# Patient Record
Sex: MALE | Race: WHITE | Employment: FULL TIME | URBAN - NONMETROPOLITAN AREA
[De-identification: names, ages, dates, MRNs, and addresses within clinical notes are randomized per-mention and may not be internally consistent; named-entity substitution may affect disease eponyms.]

---

## 2017-09-11 ENCOUNTER — OFFICE VISIT (OUTPATIENT)
Dept: ONCOLOGY | Age: 27
End: 2017-09-11
Payer: MEDICARE

## 2017-09-11 ENCOUNTER — HOSPITAL ENCOUNTER (OUTPATIENT)
Dept: INFUSION THERAPY | Age: 27
Discharge: HOME | End: 2017-09-11
Payer: MEDICARE

## 2017-09-11 VITALS
DIASTOLIC BLOOD PRESSURE: 69 MMHG | WEIGHT: 117.6 LBS | TEMPERATURE: 97.3 F | BODY MASS INDEX: 19.59 KG/M2 | RESPIRATION RATE: 18 BRPM | HEIGHT: 65 IN | OXYGEN SATURATION: 99 % | SYSTOLIC BLOOD PRESSURE: 117 MMHG | HEART RATE: 91 BPM

## 2017-09-11 DIAGNOSIS — Q87.89: ICD-10-CM

## 2017-09-11 DIAGNOSIS — D69.1 PLATELET DISORDER (HCC): Primary | ICD-10-CM

## 2017-09-11 DIAGNOSIS — D69.1 PLATELET DISORDER (HCC): ICD-10-CM

## 2017-09-11 DIAGNOSIS — D69.6 THROMBOCYTOPENIA (HCC): ICD-10-CM

## 2017-09-11 LAB
APTT: 36.4 SECONDS (ref 22–38)
BASOPHILS # BLD: 0.6 %
BASOPHILS ABSOLUTE: 0 THOU/MM3 (ref 0–0.1)
BUN, WHOLE BLOOD: 18 MG/DL (ref 8–26)
CHLORIDE, WHOLE BLOOD: 100 MEQ/L (ref 98–109)
CREATININE, WHOLE BLOOD: 1 MG/DL (ref 0.5–1.2)
EOSINOPHIL # BLD: 3.7 %
EOSINOPHILS ABSOLUTE: 0.2 THOU/MM3 (ref 0–0.4)
GFR, ESTIMATED ,CON: > 90 ML/MIN/1.73M2
GLUCOSE, WHOLE BLOOD: 99 MG/DL (ref 70–108)
HCT VFR BLD CALC: 44.1 % (ref 42–52)
HEMOGLOBIN: 14.8 GM/DL (ref 14–18)
INR BLD: 0.99 (ref 0.85–1.13)
IONIZED CALCIUM, WHOLE BLOOD: 1.3 MMOL/L (ref 1.12–1.32)
LYMPHOCYTES # BLD: 27 %
LYMPHOCYTES ABSOLUTE: 1.5 THOU/MM3 (ref 1–4.8)
MCH RBC QN AUTO: 32.2 PG (ref 27–31)
MCHC RBC AUTO-ENTMCNC: 33.6 GM/DL (ref 33–37)
MCV RBC AUTO: 96 FL (ref 80–94)
MONOCYTES # BLD: 6.6 %
MONOCYTES ABSOLUTE: 0.4 THOU/MM3 (ref 0.4–1.3)
NUCLEATED RED BLOOD CELLS: 0 /100 WBC
PDW BLD-RTO: 10.6 % (ref 11.5–14.5)
PLATELET # BLD: 124 THOU/MM3 (ref 130–400)
PMV BLD AUTO: 9 MCM (ref 7.4–10.4)
POTASSIUM, WHOLE BLOOD: 4.4 MEQ/L (ref 3.5–4.9)
RBC # BLD: 4.61 MILL/MM3 (ref 4.7–6.1)
RBC # BLD: NORMAL 10*6/UL
SEG NEUTROPHILS: 62.1 %
SEGMENTED NEUTROPHILS ABSOLUTE COUNT: 3.5 THOU/MM3 (ref 1.8–7.7)
SODIUM, WHOLE BLOOD: 139 MEQ/L (ref 138–146)
TOTAL CO2, WHOLE BLOOD: 30 MEQ/L (ref 23–33)
WBC # BLD: 5.6 THOU/MM3 (ref 4.8–10.8)

## 2017-09-11 PROCEDURE — 99203 OFFICE O/P NEW LOW 30 MIN: CPT | Performed by: INTERNAL MEDICINE

## 2017-09-11 PROCEDURE — 85610 PROTHROMBIN TIME: CPT

## 2017-09-11 PROCEDURE — 85730 THROMBOPLASTIN TIME PARTIAL: CPT

## 2017-09-11 PROCEDURE — 36415 COLL VENOUS BLD VENIPUNCTURE: CPT

## 2017-09-11 PROCEDURE — 99211 OFF/OP EST MAY X REQ PHY/QHP: CPT

## 2017-09-11 PROCEDURE — G8427 DOCREV CUR MEDS BY ELIG CLIN: HCPCS | Performed by: INTERNAL MEDICINE

## 2017-09-11 PROCEDURE — 80047 BASIC METABLC PNL IONIZED CA: CPT

## 2017-09-11 PROCEDURE — 1036F TOBACCO NON-USER: CPT | Performed by: INTERNAL MEDICINE

## 2017-09-11 PROCEDURE — 85025 COMPLETE CBC W/AUTO DIFF WBC: CPT

## 2017-09-11 PROCEDURE — G8420 CALC BMI NORM PARAMETERS: HCPCS | Performed by: INTERNAL MEDICINE

## 2017-09-11 RX ORDER — LEVOTHYROXINE SODIUM 50 MCG
50 TABLET ORAL DAILY
COMMUNITY
Start: 2017-08-22

## 2017-09-11 RX ORDER — TIZANIDINE 2 MG/1
2 TABLET ORAL EVERY 8 HOURS PRN
COMMUNITY
Start: 2017-08-30 | End: 2017-09-11 | Stop reason: ALTCHOICE

## 2017-09-11 RX ORDER — IBUPROFEN 200 MG
200 TABLET ORAL EVERY 6 HOURS PRN
COMMUNITY

## 2017-09-11 NOTE — MR AVS SNAPSHOT
After Visit Summary             Juan Hoffman   2017 1:30 PM   Office Visit    Description:  Male : 1990   Provider:  Anup Oro MD   Department:  [id number] [address] and Future Appointments         Below is a list of your follow-up and future appointments. This may not be a complete list as you may have made appointments directly with providers that we are not aware of or your providers may have made some for you. Please call your providers to confirm appointments. It is important to keep your appointments. Please bring your current insurance card, photo ID, co-pay, and all medication bottles to your appointment. If self-pay, payment is expected at the time of service. Your To-Do List     Future Appointments Provider Department Dept Phone    10/17/2017 3:00 PM Alfrieda Hammans, MD [facility] [phone]    Please arrive 15 minutes prior to scheduled appointment time to complete paper work, bring photo ID and insurance cards. Future Orders Complete By Expires    APTT [RUH967 Custom]  2017    CBC Auto Differential [[id number] Custom]  2017    Hepatic Function Panel [LAB20 Custom]  2017    POC PANEL BMP W/IOCA [[id number] Custom]  2017    Protime-INR [CNQ690 Custom]  2017    CBC Auto Differential [[id number] Custom]  9/10/2018 2018    Follow-Up    Return in about 1 year (around 9/10/2018).          Information from Your Visit        Department     Name Address Phone Fax    [id number] of [facility] [address] [address] [facility] [address] [phone]      You Were Seen for:         Comments    Platelet disorder [facility])   [[id number]]         Vital Signs     Blood Pressure Pulse Temperature Respirations Height Weight    117/69 (Site: Right Arm, Position: Sitting, Cuff Size: Medium Adult) 91 you do not sign up before the expiration date, you must request a new code. [employer] Access Code: [email or web address]  Expires: 11/10/2017  2:32 PM    4. Enter your Social Security Number ([id number]) and Date of Birth (mm/dd/yyyy) as indicated and click Submit. You will be taken to the next sign-up page. 5. Create a [employer] ID. This will be your [employer] login ID and cannot be changed, so think of one that is secure and easy to remember. 6. Create a [employer] password. You can change your password at any time. 7. Enter your Password Reset Question and Answer. This can be used at a later time if you forget your password. 8. Enter your e-mail address. You will receive e-mail notification when new information is available in [address] 9. Click Sign Up. You can now view your medical record. Additional Information  If you have questions, please contact the physician practice where you receive care. Remember, [employer] is NOT to be used for urgent needs. For medical emergencies, dial 911. For questions regarding your [employer] account call [phone]. If you have a clinical question, please call your doctor's office.

## 2017-10-16 PROBLEM — D69.1 PLATELET DISORDER (HCC): Status: ACTIVE | Noted: 2017-10-16

## 2017-10-16 PROBLEM — D69.6 THROMBOCYTOPENIA (HCC): Status: ACTIVE | Noted: 2017-10-16

## 2017-10-16 PROBLEM — Q87.89: Status: ACTIVE | Noted: 2017-10-16

## 2017-10-16 ASSESSMENT — ENCOUNTER SYMPTOMS
GASTROINTESTINAL NEGATIVE: 1
EYES NEGATIVE: 1
RESPIRATORY NEGATIVE: 1

## 2017-10-17 ENCOUNTER — OFFICE VISIT (OUTPATIENT)
Dept: CARDIOLOGY CLINIC | Age: 27
End: 2017-10-17
Payer: MEDICARE

## 2017-10-17 VITALS
BODY MASS INDEX: 20.69 KG/M2 | HEIGHT: 64 IN | HEART RATE: 94 BPM | SYSTOLIC BLOOD PRESSURE: 108 MMHG | WEIGHT: 121.2 LBS | DIASTOLIC BLOOD PRESSURE: 84 MMHG

## 2017-10-17 DIAGNOSIS — Q21.0 VSD (VENTRICULAR SEPTAL DEFECT): Primary | ICD-10-CM

## 2017-10-17 PROCEDURE — 99203 OFFICE O/P NEW LOW 30 MIN: CPT | Performed by: NUCLEAR MEDICINE

## 2017-10-17 PROCEDURE — 1036F TOBACCO NON-USER: CPT | Performed by: NUCLEAR MEDICINE

## 2017-10-17 PROCEDURE — G8420 CALC BMI NORM PARAMETERS: HCPCS | Performed by: NUCLEAR MEDICINE

## 2017-10-17 PROCEDURE — G8484 FLU IMMUNIZE NO ADMIN: HCPCS | Performed by: NUCLEAR MEDICINE

## 2017-10-17 PROCEDURE — G8427 DOCREV CUR MEDS BY ELIG CLIN: HCPCS | Performed by: NUCLEAR MEDICINE

## 2017-10-17 PROCEDURE — 93000 ELECTROCARDIOGRAM COMPLETE: CPT | Performed by: NUCLEAR MEDICINE

## 2017-10-17 ASSESSMENT — ENCOUNTER SYMPTOMS
CONSTIPATION: 0
ABDOMINAL DISTENTION: 0
RECTAL PAIN: 0
FACIAL SWELLING: 0
NAUSEA: 0
ANAL BLEEDING: 0
DIARRHEA: 0
SHORTNESS OF BREATH: 1
ABDOMINAL PAIN: 0
VOMITING: 0
BACK PAIN: 0
PHOTOPHOBIA: 0
CHEST TIGHTNESS: 0
BLOOD IN STOOL: 0

## 2017-10-17 NOTE — PROGRESS NOTES
[address] [facility]  [facility]  [address] [facility] [address]  Dept: [phone]  Dept Fax: [phone]  Loc: [phone]    Visit Date: 10/17/2017    Tyrone Joyner is a 33 y.o. male who presents today for:  Chief Complaint   Patient presents with   [facility] Establish Cardiologist    Ventricular Septal Defect     Here as a new patient   Used to see [facility] for a VSD  They were seeing some genetic specialist for some unusual diagnosis  No specific diagnosis given for now  Found to have a VSD  Followed since then   Does have unusal features   No given diagnosis  No smoking   Family history of CAD    HPI:  HPI  Past Medical History:   Diagnosis Date    Bleeds easily ([address]     Bruises easily     Thyroid disease     VSD (ventricular septal defect and aortic arch hypoplasia       Past Surgical History:   Procedure Laterality Date    DENTAL SURGERY      EXTERNAL EAR SURGERY      EYE SURGERY      HERNIA REPAIR      HIP SURGERY  2003    MANDIBLE SURGERY       Family History   Problem Relation Age of Onset    No Known Problems Mother     No Known Problems Father      Social History   Substance Use Topics    Smoking status: Never Smoker    Smokeless tobacco: Never Used    Alcohol use No      Current Outpatient Prescriptions   Medication Sig Dispense Refill    SYNTHROID 50 MCG tablet Take 50 mcg by mouth daily       ibuprofen (ADVIL;MOTRIN) 200 MG tablet Take 200 mg by mouth every 6 hours as needed for Pain       No current facility-administered medications for this visit. No Known Allergies  Health Maintenance   Topic Date Due    HIV screen  07/29/2006    DTaP/Tdap/Td vaccine (1 - Tdap) 07/29/2010    Flu vaccine (1) 09/01/2017       Subjective:  Review of Systems   Constitutional: Positive for fatigue. Negative for chills and diaphoresis. HENT: Negative for facial swelling and mouth sores. Eyes: Negative for photophobia.    Respiratory: Positive for shortness

## 2017-10-17 NOTE — PROGRESS NOTES
[facility] CANCER CENTER  CANCER NETWORK OF [facility]  [facility] [address] [facility]  [address] [address]  Dept: [phone]  Dept Fax: [phone]  Loc: [phone]    Subjective:      Chief Complaint: Zita Angela is a 33 y.o. male with thrombocytopenia. HPI: This is the first visit to our clinic for this 52-year-old male. He is accompanied by his mother who also provide some of his medical history. The patient has previously been followed at [facility] in [place] as well as the [facility] at [facility]. 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 The patient has required DDAVP and platelet transfusions as necessary for any specific procedures. He does not have any specific hematological complaints today. The patient would like to transfer his care from Dr. Norberto Varela at the [facility] to our clinic for further evaluation and management of his hematological disease locally. At this time he does not have any evidence of abnormal bleeding or bruising. His general health has been stable. He has no specific complaints today. The patient denies shortness of breath, chest pain, a change in bowel habits or a change in bladder habits. ECOG performance status is level 0. Past Medical History  She has a past medical history of Bleeds easily ([address] Bruises easily; and Thyroid disease. Surgical History  She has a past surgical history that includes hip surgery (2003). Home Medications  She has a current medication list which includes the following prescription(s): synthroid and ibuprofen. Allergies  No Known Allergies    Social History  She reports that he has never smoked.  He has never used smokeless tobacco. He reports that he does not drink alcohol or use

## 2018-09-17 DIAGNOSIS — D69.6 THROMBOCYTOPENIA (HCC): Primary | ICD-10-CM

## 2018-09-19 ENCOUNTER — OFFICE VISIT (OUTPATIENT)
Dept: ONCOLOGY | Age: 28
End: 2018-09-19
Payer: MEDICARE

## 2018-09-19 ENCOUNTER — HOSPITAL ENCOUNTER (OUTPATIENT)
Dept: INFUSION THERAPY | Age: 28
Discharge: HOME | End: 2018-09-19
Payer: MEDICARE

## 2018-09-19 VITALS
HEIGHT: 64 IN | BODY MASS INDEX: 21.24 KG/M2 | HEART RATE: 90 BPM | SYSTOLIC BLOOD PRESSURE: 100 MMHG | TEMPERATURE: 97.8 F | WEIGHT: 124.4 LBS | DIASTOLIC BLOOD PRESSURE: 64 MMHG | RESPIRATION RATE: 18 BRPM

## 2018-09-19 DIAGNOSIS — D69.6 THROMBOCYTOPENIA (HCC): Primary | ICD-10-CM

## 2018-09-19 DIAGNOSIS — D69.1 PLATELET DISORDER (HCC): ICD-10-CM

## 2018-09-19 DIAGNOSIS — Q87.89: ICD-10-CM

## 2018-09-19 DIAGNOSIS — D69.6 THROMBOCYTOPENIA (HCC): ICD-10-CM

## 2018-09-19 LAB
BASOPHILS # BLD: 0.9 %
BASOPHILS ABSOLUTE: 0 THOU/MM3 (ref 0–0.1)
EOSINOPHIL # BLD: 3.8 %
EOSINOPHILS ABSOLUTE: 0.2 THOU/MM3 (ref 0–0.4)
HCT VFR BLD CALC: 43.8 % (ref 42–52)
HEMOGLOBIN: 14.6 GM/DL (ref 14–18)
IMMATURE GRANS (ABS): 0.02 THOU/MM3 (ref 0–0.07)
IMMATURE GRANULOCYTES: 0.4 %
LYMPHOCYTES # BLD: 30.4 %
LYMPHOCYTES ABSOLUTE: 1.5 THOU/MM3 (ref 1–4.8)
MCH RBC QN AUTO: 32.7 PG (ref 27–31)
MCHC RBC AUTO-ENTMCNC: 33.3 GM/DL (ref 33–37)
MCV RBC AUTO: 98 FL (ref 80–94)
MONOCYTES # BLD: 9.9 %
MONOCYTES ABSOLUTE: 0.5 THOU/MM3 (ref 0.4–1.3)
NUCLEATED RED BLOOD CELLS: 0 /100 WBC
PDW BLD-RTO: 11 % (ref 11.5–14.5)
PLATELET # BLD: 102 THOU/MM3 (ref 130–400)
PMV BLD AUTO: 9.2 FL (ref 7.4–10.4)
RBC # BLD: 4.46 MILL/MM3 (ref 4.7–6.1)
SEG NEUTROPHILS: 54.6 %
SEGMENTED NEUTROPHILS ABSOLUTE COUNT: 2.6 THOU/MM3 (ref 1.8–7.7)
WBC # BLD: 4.8 THOU/MM3 (ref 4.8–10.8)

## 2018-09-19 PROCEDURE — 36415 COLL VENOUS BLD VENIPUNCTURE: CPT

## 2018-09-19 PROCEDURE — 99211 OFF/OP EST MAY X REQ PHY/QHP: CPT

## 2018-09-19 PROCEDURE — 99213 OFFICE O/P EST LOW 20 MIN: CPT | Performed by: INTERNAL MEDICINE

## 2018-09-19 PROCEDURE — 1036F TOBACCO NON-USER: CPT | Performed by: INTERNAL MEDICINE

## 2018-09-19 PROCEDURE — 85025 COMPLETE CBC W/AUTO DIFF WBC: CPT

## 2018-09-19 PROCEDURE — G8420 CALC BMI NORM PARAMETERS: HCPCS | Performed by: INTERNAL MEDICINE

## 2018-09-19 PROCEDURE — G8427 DOCREV CUR MEDS BY ELIG CLIN: HCPCS | Performed by: INTERNAL MEDICINE

## 2018-09-19 RX ORDER — M-VIT,TX,IRON,MINS/CALC/FOLIC 27MG-0.4MG
1 TABLET ORAL DAILY
COMMUNITY

## 2018-10-16 ENCOUNTER — OFFICE VISIT (OUTPATIENT)
Dept: CARDIOLOGY CLINIC | Age: 28
End: 2018-10-16
Payer: MEDICARE

## 2018-10-16 VITALS
WEIGHT: 122.2 LBS | HEART RATE: 80 BPM | BODY MASS INDEX: 20.86 KG/M2 | DIASTOLIC BLOOD PRESSURE: 72 MMHG | SYSTOLIC BLOOD PRESSURE: 110 MMHG | HEIGHT: 64 IN

## 2018-10-16 DIAGNOSIS — Q25.42 VSD (VENTRICULAR SEPTAL DEFECT AND AORTIC ARCH HYPOPLASIA: Primary | ICD-10-CM

## 2018-10-16 DIAGNOSIS — Q21.0 VSD (VENTRICULAR SEPTAL DEFECT AND AORTIC ARCH HYPOPLASIA: Primary | ICD-10-CM

## 2018-10-16 PROCEDURE — 99213 OFFICE O/P EST LOW 20 MIN: CPT | Performed by: NUCLEAR MEDICINE

## 2018-10-16 PROCEDURE — G8484 FLU IMMUNIZE NO ADMIN: HCPCS | Performed by: NUCLEAR MEDICINE

## 2018-10-16 PROCEDURE — G8420 CALC BMI NORM PARAMETERS: HCPCS | Performed by: NUCLEAR MEDICINE

## 2018-10-16 PROCEDURE — G8427 DOCREV CUR MEDS BY ELIG CLIN: HCPCS | Performed by: NUCLEAR MEDICINE

## 2018-10-16 PROCEDURE — 1036F TOBACCO NON-USER: CPT | Performed by: NUCLEAR MEDICINE

## 2018-10-25 DIAGNOSIS — Q25.42 VSD (VENTRICULAR SEPTAL DEFECT AND AORTIC ARCH HYPOPLASIA: ICD-10-CM

## 2018-10-25 DIAGNOSIS — Q21.0 VSD (VENTRICULAR SEPTAL DEFECT AND AORTIC ARCH HYPOPLASIA: ICD-10-CM

## 2018-10-31 ENCOUNTER — TELEPHONE (OUTPATIENT)
Dept: CARDIOLOGY CLINIC | Age: 28
End: 2018-10-31

## 2019-05-30 ENCOUNTER — TELEPHONE (OUTPATIENT)
Dept: ONCOLOGY | Age: 29
End: 2019-05-30

## 2019-05-30 ENCOUNTER — TELEPHONE (OUTPATIENT)
Dept: CARDIOLOGY CLINIC | Age: 29
End: 2019-05-30

## 2019-05-30 NOTE — TELEPHONE ENCOUNTER
Pre op Risk Assessment    Can pt be cleared or do you need to see him in office? Procedure R Ear complete with debridement and possible bx.    Physician Dr. Maya Rhodes  Date of surgery/procedure 6-4-19    Last OV 10/16/19  Last Stress   Last Echo 10/25/19  Last Cath   Last Stent   Is patient on blood thinners   Hold Meds/how many days

## 2019-05-30 NOTE — TELEPHONE ENCOUNTER
Ricco Salazar from [facility] called to inquire about clearance for procedure that Dr. Sebastián Vazquez office faxed over. I faxed copy to her @ [phone].

## 2019-09-18 ENCOUNTER — HOSPITAL ENCOUNTER (OUTPATIENT)
Dept: INFUSION THERAPY | Age: 29
Discharge: HOME | End: 2019-09-18
Payer: MEDICARE

## 2019-09-18 ENCOUNTER — OFFICE VISIT (OUTPATIENT)
Dept: ONCOLOGY | Age: 29
End: 2019-09-18
Payer: MEDICARE

## 2019-09-18 VITALS
HEIGHT: 64 IN | HEART RATE: 82 BPM | OXYGEN SATURATION: 100 % | DIASTOLIC BLOOD PRESSURE: 49 MMHG | SYSTOLIC BLOOD PRESSURE: 98 MMHG | WEIGHT: 124.2 LBS | RESPIRATION RATE: 18 BRPM | TEMPERATURE: 98 F | BODY MASS INDEX: 21.21 KG/M2

## 2019-09-18 DIAGNOSIS — D69.6 THROMBOCYTOPENIA (HCC): ICD-10-CM

## 2019-09-18 DIAGNOSIS — D69.6 THROMBOCYTOPENIA (HCC): Primary | ICD-10-CM

## 2019-09-18 DIAGNOSIS — Q87.89: ICD-10-CM

## 2019-09-18 DIAGNOSIS — D69.1 PLATELET DISORDER (HCC): ICD-10-CM

## 2019-09-18 LAB
HCT VFR BLD CALC: 45.7 % (ref 42–52)
HEMOGLOBIN: 15.3 GM/DL (ref 14–18)
MCH RBC QN AUTO: 32.9 PG (ref 27–31)
MCHC RBC AUTO-ENTMCNC: 33.6 GM/DL (ref 33–37)
MCV RBC AUTO: 98 FL (ref 80–94)
PDW BLD-RTO: 11.4 % (ref 11.5–14.5)
PLATELET # BLD: 125 THOU/MM3 (ref 130–400)
PMV BLD AUTO: 9.2 FL (ref 7.4–10.4)
RBC # BLD: 4.66 MILL/MM3 (ref 4.7–6.1)
WBC # BLD: 5.5 THOU/MM3 (ref 4.8–10.8)

## 2019-09-18 PROCEDURE — 36415 COLL VENOUS BLD VENIPUNCTURE: CPT

## 2019-09-18 PROCEDURE — G8427 DOCREV CUR MEDS BY ELIG CLIN: HCPCS | Performed by: INTERNAL MEDICINE

## 2019-09-18 PROCEDURE — 85027 COMPLETE CBC AUTOMATED: CPT

## 2019-09-18 PROCEDURE — 99213 OFFICE O/P EST LOW 20 MIN: CPT | Performed by: INTERNAL MEDICINE

## 2019-09-18 PROCEDURE — 99211 OFF/OP EST MAY X REQ PHY/QHP: CPT

## 2019-09-18 PROCEDURE — 1036F TOBACCO NON-USER: CPT | Performed by: INTERNAL MEDICINE

## 2019-09-18 PROCEDURE — G8420 CALC BMI NORM PARAMETERS: HCPCS | Performed by: INTERNAL MEDICINE

## 2019-09-27 NOTE — PROGRESS NOTES
stable. Constitutional: Appears comfortable. No acute distress. HENT: Normocephalic and atraumatic. Eyes: Pupils are equal and reactive. No scleral icterus. Neck: Overall appearance is symmetrical. No identifiable masses. Pulmonary: Effort normal. No respiratory distress. Abdominal: Soft. No hepatomegaly or splenomegaly. Musculoskeletal: Gait is normal. Muscle strength and tone good. Neurological: Alert and oriented to person, place, and time. Judgment and thought content impaired. Skin: Skin is warm and dry. No rash. No petechiae   Psychiatric: Mood and affect appropriate for the clinical situation. Behavior is normal.      Data Analysis:    Hematology 9/18/2019 9/19/2018 9/11/2017   WBC 5.5 4.8 5.6   RBC 4.66 (L) 4.46 (L) 4.61 (L)   HGB 15.3 14.6 14.8   HCT 45.7 43.8 44.1   MCV 98 (H) 98 (H) 96 (H)   RDW 11.4 (L) 11.0 (L) 10.6 (L)    (L) 102 (L) 124 (L)     Assessment:   1. 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 Plan:   1. Monitor platelet count and for any signs of abnormal bleeding/bruising. 2. DDAVP and platelet transfusions for procedures as needed. 3. Follow up with primary care provider for general medical care. 4.  Return to hematology clinic in 1 year. Barrera Garrido M.D. Medical Director: [facility]  Cancer [facility]  [address] [facility], [address] [place] [address], [address] [place] [address], [address] [place] [address] of [facility] at [facility]      **This report has been created using voice recognition software. It may contain minor errors which are inherent in voice recognition technology. **

## 2019-10-22 ENCOUNTER — OFFICE VISIT (OUTPATIENT)
Dept: CARDIOLOGY CLINIC | Age: 29
End: 2019-10-22
Payer: MEDICARE

## 2019-10-22 VITALS
HEART RATE: 96 BPM | SYSTOLIC BLOOD PRESSURE: 104 MMHG | DIASTOLIC BLOOD PRESSURE: 62 MMHG | BODY MASS INDEX: 21.38 KG/M2 | WEIGHT: 125.22 LBS | HEIGHT: 64 IN

## 2019-10-22 DIAGNOSIS — Q21.0 VSD (VENTRICULAR SEPTAL DEFECT AND AORTIC ARCH HYPOPLASIA: Primary | ICD-10-CM

## 2019-10-22 DIAGNOSIS — Q25.42 VSD (VENTRICULAR SEPTAL DEFECT AND AORTIC ARCH HYPOPLASIA: Primary | ICD-10-CM

## 2019-10-22 PROCEDURE — G8420 CALC BMI NORM PARAMETERS: HCPCS | Performed by: NUCLEAR MEDICINE

## 2019-10-22 PROCEDURE — 99213 OFFICE O/P EST LOW 20 MIN: CPT | Performed by: NUCLEAR MEDICINE

## 2019-10-22 PROCEDURE — G8427 DOCREV CUR MEDS BY ELIG CLIN: HCPCS | Performed by: NUCLEAR MEDICINE

## 2019-10-22 PROCEDURE — 1036F TOBACCO NON-USER: CPT | Performed by: NUCLEAR MEDICINE

## 2019-10-22 PROCEDURE — G8484 FLU IMMUNIZE NO ADMIN: HCPCS | Performed by: NUCLEAR MEDICINE

## 2020-09-29 NOTE — PROGRESS NOTES
[facility] CANCER CENTER  CANCER NETWORK OF [facility]  [facility] [address] [facility]  [address]  [address]  Dept: [phone]  Dept Fax: [phone]  Loc: [phone]    Subjective:      Chief Complaint: Huong Victor is a 35 y.o. male with thrombocytopenia. The patient has previously been followed at [facility] in [place] as well as the [facility] at [facility]. 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 The patient has required DDAVP and platelet transfusions as necessary for any specific procedures    HPI: Yanet Norton returns today for follow-up regarding his history of thrombocytopenia and platelet function disorder. He is accompanied by his mother on today's evaluation. Since being seen here last he states his general sense of wellbeing has been good. He has had no significant changes in his overall health. The patient denies any abnormal bleeding or bruising. He has not had fever, cough, shortness of breath or other signs of infection. The patient's bowel and bladder habits have been normal.  He has not seen blood in his stool or urine. The patient remains active with an ECOG performance status of level 0. PMH, SH, and FH:  I reviewed the patients medication list and allergy list as noted on the electronic medical record. The PMH, SH and FH were also reviewed as noted on the EMR. Review of Systems   Constitutional: Negative. HENT: Negative. Eyes: Negative. Respiratory: Negative. Cardiovascular: Negative. Gastrointestinal: Negative. Genitourinary: Negative. Musculoskeletal: Negative. Skin: Negative. Neurological: Negative. Hematological: Negative. Psychiatric/Behavioral: Negative.       Objective:   Physical Exam   Vitals:    09/30/20 1550   BP: 111/61   Pulse: 88   Resp: 17   Temp: 98.7 °F (37.1 °C)   SpO2: 98%   Vitals reviewed and are stable. Constitutional: Well-developed and well-nourished. No acute distress. HENT: Normocephalic and atraumatic. Eyes: Pupils are equal and reactive. No scleral icterus. Neck: Overall appearance is symmetrical. No identifiable masses. Pulmonary: Effort normal. No respiratory distress. .  Neurological: Alert and oriented to person, place, and time. Judgment and thought content mildly impaired. Skin: Skin is warm and dry. No rash. Psychiatric: Mood and affect appropriate for the clinical situation. Behavior is normal.      Data Analysis:    Hematology 9/30/2020 9/18/2019 9/19/2018   WBC 5.8 5.5 4.8   RBC 4.35 (L) 4.66 (L) 4.46 (L)   HGB 14.1 15.3 14.6   HCT 43.4 45.7 43.8    (H) 98 (H) 98 (H)   RDW 12.3 11.4 (L) 11.0 (L)    (L) 125 (L) 102 (L)     Assessment:   1. 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 Plan:   1. Monitor platelet count and for any signs of abnormal bleeding/bruising. 2. DDAVP and platelet transfusions for procedures as needed. 3. Follow up with primary care provider for general medical care. Daniel Pearl M.D. Medical Director: [facility]  Cancer Network [facility]  [address] [facility], [address] [place] [address], [address] [place] [address], [address] [place] [address] of [facility] at [facility]      **This report has been created using voice recognition software. It may contain minor errors which are inherent in voice recognition technology. **

## 2020-09-30 ENCOUNTER — OFFICE VISIT (OUTPATIENT)
Dept: ONCOLOGY | Age: 30
End: 2020-09-30
Payer: MEDICARE

## 2020-09-30 ENCOUNTER — HOSPITAL ENCOUNTER (OUTPATIENT)
Dept: INFUSION THERAPY | Age: 30
Discharge: HOME | End: 2020-09-30
Payer: MEDICARE

## 2020-09-30 VITALS
RESPIRATION RATE: 17 BRPM | SYSTOLIC BLOOD PRESSURE: 111 MMHG | HEIGHT: 64 IN | HEART RATE: 88 BPM | DIASTOLIC BLOOD PRESSURE: 61 MMHG | OXYGEN SATURATION: 98 % | WEIGHT: 123.4 LBS | BODY MASS INDEX: 21.07 KG/M2 | TEMPERATURE: 98.7 F

## 2020-09-30 DIAGNOSIS — D69.6 THROMBOCYTOPENIA (HCC): ICD-10-CM

## 2020-09-30 LAB
ABSOLUTE IMMATURE GRANULOCYTE: 0 THOU/MM3 (ref 0–0.07)
BASINOPHIL, AUTOMATED: 1 % (ref 0–3)
BASOPHILS ABSOLUTE: 0.1 THOU/MM3 (ref 0–0.1)
EOSINOPHILS ABSOLUTE: 0.3 THOU/MM3 (ref 0–0.4)
EOSINOPHILS RELATIVE PERCENT: 5 % (ref 0–4)
HCT VFR BLD CALC: 43.4 % (ref 42–52)
HEMOGLOBIN: 14.1 GM/DL (ref 14–18)
IMMATURE GRANULOCYTES: 0 %
LYMPHOCYTES # BLD: 34 % (ref 15–47)
LYMPHOCYTES ABSOLUTE: 2 THOU/MM3 (ref 1–4.8)
MCH RBC QN AUTO: 32.4 PG (ref 26–33)
MCHC RBC AUTO-ENTMCNC: 32.5 GM/DL (ref 32.2–35.5)
MCV RBC AUTO: 100 FL (ref 80–94)
MONOCYTES ABSOLUTE: 0.5 THOU/MM3 (ref 0.4–1.3)
MONOCYTES: 8 % (ref 0–12)
PDW BLD-RTO: 12.3 % (ref 11.5–14.5)
PLATELET # BLD: 124 THOU/MM3 (ref 130–400)
PMV BLD AUTO: 11.2 FL (ref 9.4–12.4)
RBC # BLD: 4.35 MILL/MM3 (ref 4.7–6.1)
SEG NEUTROPHILS: 51 % (ref 43–75)
SEGMENTED NEUTROPHILS ABSOLUTE COUNT: 3 THOU/MM3 (ref 1.8–7.7)
WBC # BLD: 5.8 THOU/MM3 (ref 4.8–10.8)

## 2020-09-30 PROCEDURE — G8427 DOCREV CUR MEDS BY ELIG CLIN: HCPCS | Performed by: INTERNAL MEDICINE

## 2020-09-30 PROCEDURE — 99213 OFFICE O/P EST LOW 20 MIN: CPT | Performed by: INTERNAL MEDICINE

## 2020-09-30 PROCEDURE — 1036F TOBACCO NON-USER: CPT | Performed by: INTERNAL MEDICINE

## 2020-09-30 PROCEDURE — 36415 COLL VENOUS BLD VENIPUNCTURE: CPT

## 2020-09-30 PROCEDURE — 85025 COMPLETE CBC W/AUTO DIFF WBC: CPT

## 2020-09-30 PROCEDURE — 99211 OFF/OP EST MAY X REQ PHY/QHP: CPT

## 2020-09-30 PROCEDURE — G8420 CALC BMI NORM PARAMETERS: HCPCS | Performed by: INTERNAL MEDICINE

## 2020-10-27 ENCOUNTER — OFFICE VISIT (OUTPATIENT)
Dept: CARDIOLOGY CLINIC | Age: 30
End: 2020-10-27
Payer: MEDICARE

## 2020-10-27 VITALS
HEART RATE: 72 BPM | SYSTOLIC BLOOD PRESSURE: 106 MMHG | HEIGHT: 64 IN | BODY MASS INDEX: 20.55 KG/M2 | DIASTOLIC BLOOD PRESSURE: 60 MMHG | WEIGHT: 120.4 LBS

## 2020-10-27 PROCEDURE — G8420 CALC BMI NORM PARAMETERS: HCPCS | Performed by: NUCLEAR MEDICINE

## 2020-10-27 PROCEDURE — 99213 OFFICE O/P EST LOW 20 MIN: CPT | Performed by: NUCLEAR MEDICINE

## 2020-10-27 PROCEDURE — 1036F TOBACCO NON-USER: CPT | Performed by: NUCLEAR MEDICINE

## 2020-10-27 PROCEDURE — G8484 FLU IMMUNIZE NO ADMIN: HCPCS | Performed by: NUCLEAR MEDICINE

## 2020-10-27 PROCEDURE — G8427 DOCREV CUR MEDS BY ELIG CLIN: HCPCS | Performed by: NUCLEAR MEDICINE

## 2020-10-28 ENCOUNTER — TELEPHONE (OUTPATIENT)
Dept: CARDIOLOGY CLINIC | Age: 30
End: 2020-10-28

## 2020-10-28 NOTE — TELEPHONE ENCOUNTER
FATEMEH FOR PATIENT TO CALL.     ECHO SCHEDULED AT [facility] 11-2-2020 @ 1 PM.  PATIENT NEEDS TO ARRIVE THROUGH THE OUTPATIENT DOORS AT 5410 PM.

## 2020-10-29 NOTE — TELEPHONE ENCOUNTER
Pt returned call, can only do Wednesdays or thursdays, late afternoon    Offered lima since [facility] latest is 2:00pm, pt prefers to have done at [facility] on a Wednesday or Thursday late as possible    Rescheduled echo to 12-02-20 @ 2:00pm at [facility]

## 2020-10-29 NOTE — TELEPHONE ENCOUNTER
Left msg for pt to call office to confirm pt can come for echo 11-02-20    Need to report to [facility], outpt dept at 12:30 for echocardiogram scheduled 11-02-20 @ 1:00pm

## 2020-12-03 ENCOUNTER — TELEPHONE (OUTPATIENT)
Dept: CARDIOLOGY CLINIC | Age: 30
End: 2020-12-03

## 2020-12-03 NOTE — TELEPHONE ENCOUNTER
please have the girls at [facility] remind me to review the echo and addend it next week

## 2020-12-09 ENCOUNTER — TELEPHONE (OUTPATIENT)
Dept: CARDIOLOGY CLINIC | Age: 30
End: 2020-12-09

## 2020-12-16 ENCOUNTER — TELEPHONE (OUTPATIENT)
Dept: CARDIOLOGY CLINIC | Age: 30
End: 2020-12-16

## 2021-01-07 ENCOUNTER — HOSPITAL ENCOUNTER (OUTPATIENT)
Dept: NURSING | Age: 31
End: 2021-01-07
Payer: MEDICARE

## 2021-06-22 DIAGNOSIS — Z13.9 SCREENING FOR CONDITION: ICD-10-CM

## 2021-08-30 ENCOUNTER — HOSPITAL ENCOUNTER (OUTPATIENT)
Dept: NURSING | Age: 31
Discharge: HOME | End: 2021-08-30
Payer: COMMERCIAL

## 2021-08-30 VITALS
DIASTOLIC BLOOD PRESSURE: 61 MMHG | HEART RATE: 82 BPM | HEIGHT: 64 IN | BODY MASS INDEX: 20.76 KG/M2 | WEIGHT: 121.6 LBS | SYSTOLIC BLOOD PRESSURE: 112 MMHG | OXYGEN SATURATION: 98 % | RESPIRATION RATE: 18 BRPM | TEMPERATURE: 97.5 F

## 2021-08-30 DIAGNOSIS — Q21.0 VSD (VENTRICULAR SEPTAL DEFECT AND AORTIC ARCH HYPOPLASIA: ICD-10-CM

## 2021-08-30 DIAGNOSIS — Q25.42 VSD (VENTRICULAR SEPTAL DEFECT AND AORTIC ARCH HYPOPLASIA: ICD-10-CM

## 2021-08-30 DIAGNOSIS — R93.1 ABNORMAL ECHOCARDIOGRAM: ICD-10-CM

## 2021-08-30 LAB
ERYTHROCYTE [DISTWIDTH] IN BLOOD BY AUTOMATED COUNT: 12.5 % (ref 11.5–14.5)
ERYTHROCYTE [DISTWIDTH] IN BLOOD BY AUTOMATED COUNT: 47.1 FL (ref 35–45)
HCT VFR BLD CALC: 49.4 % (ref 42–52)
HEMOGLOBIN: 15.9 GM/DL (ref 14–18)
LV EF: 60 %
LVEF MODALITY: NORMAL
MCH RBC QN AUTO: 32.9 PG (ref 26–33)
MCHC RBC AUTO-ENTMCNC: 32.2 GM/DL (ref 32.2–35.5)
MCV RBC AUTO: 102.1 FL (ref 80–94)
PLATELET # BLD: 116 THOU/MM3 (ref 130–400)
PMV BLD AUTO: 12.2 FL (ref 9.4–12.4)
RBC # BLD: 4.84 MILL/MM3 (ref 4.7–6.1)
WBC # BLD: 7.2 THOU/MM3 (ref 4.8–10.8)

## 2021-08-30 PROCEDURE — 6360000002 HC RX W HCPCS: Performed by: NUCLEAR MEDICINE

## 2021-08-30 PROCEDURE — 93312 ECHO TRANSESOPHAGEAL: CPT

## 2021-08-30 PROCEDURE — 99152 MOD SED SAME PHYS/QHP 5/>YRS: CPT

## 2021-08-30 PROCEDURE — 6370000000 HC RX 637 (ALT 250 FOR IP)

## 2021-08-30 PROCEDURE — 85027 COMPLETE CBC AUTOMATED: CPT

## 2021-08-30 PROCEDURE — 2580000003 HC RX 258: Performed by: PHYSICIAN ASSISTANT

## 2021-08-30 PROCEDURE — 93325 DOPPLER ECHO COLOR FLOW MAPG: CPT

## 2021-08-30 PROCEDURE — 93320 DOPPLER ECHO COMPLETE: CPT

## 2021-08-30 PROCEDURE — 36415 COLL VENOUS BLD VENIPUNCTURE: CPT

## 2021-08-30 RX ORDER — SODIUM CHLORIDE 9 MG/ML
INJECTION, SOLUTION INTRAVENOUS CONTINUOUS
Status: DISCONTINUED | OUTPATIENT
Start: 2021-08-30 | End: 2021-08-31 | Stop reason: HOSPADM

## 2021-08-30 RX ORDER — SODIUM CHLORIDE 0.9 % (FLUSH) 0.9 %
5-40 SYRINGE (ML) INJECTION EVERY 12 HOURS SCHEDULED
Status: DISCONTINUED | OUTPATIENT
Start: 2021-08-30 | End: 2021-08-31 | Stop reason: HOSPADM

## 2021-08-30 RX ORDER — SODIUM CHLORIDE 0.9 % (FLUSH) 0.9 %
5-40 SYRINGE (ML) INJECTION PRN
Status: DISCONTINUED | OUTPATIENT
Start: 2021-08-30 | End: 2021-08-31 | Stop reason: HOSPADM

## 2021-08-30 RX ORDER — FLUMAZENIL 0.1 MG/ML
0.2 INJECTION, SOLUTION INTRAVENOUS PRN
Status: DISCONTINUED | OUTPATIENT
Start: 2021-08-30 | End: 2021-08-31 | Stop reason: HOSPADM

## 2021-08-30 RX ORDER — MIDAZOLAM HYDROCHLORIDE 1 MG/ML
1 INJECTION INTRAMUSCULAR; INTRAVENOUS PRN
Status: DISCONTINUED | OUTPATIENT
Start: 2021-08-30 | End: 2021-08-31 | Stop reason: HOSPADM

## 2021-08-30 RX ORDER — NALOXONE HYDROCHLORIDE 0.4 MG/ML
0.4 INJECTION, SOLUTION INTRAMUSCULAR; INTRAVENOUS; SUBCUTANEOUS PRN
Status: DISCONTINUED | OUTPATIENT
Start: 2021-08-30 | End: 2021-08-31 | Stop reason: HOSPADM

## 2021-08-30 RX ORDER — SODIUM CHLORIDE 9 MG/ML
25 INJECTION, SOLUTION INTRAVENOUS PRN
Status: DISCONTINUED | OUTPATIENT
Start: 2021-08-30 | End: 2021-08-31 | Stop reason: HOSPADM

## 2021-08-30 RX ORDER — FENTANYL CITRATE 50 UG/ML
25 INJECTION, SOLUTION INTRAMUSCULAR; INTRAVENOUS PRN
Status: DISCONTINUED | OUTPATIENT
Start: 2021-08-30 | End: 2021-08-31 | Stop reason: HOSPADM

## 2021-08-30 RX ADMIN — FENTANYL CITRATE 25 MCG: 50 INJECTION, SOLUTION INTRAMUSCULAR; INTRAVENOUS at 14:33

## 2021-08-30 RX ADMIN — MIDAZOLAM 1 MG: 1 INJECTION INTRAMUSCULAR; INTRAVENOUS at 14:33

## 2021-08-30 RX ADMIN — SODIUM CHLORIDE: 9 INJECTION, SOLUTION INTRAVENOUS at 14:15

## 2021-08-30 RX ADMIN — FENTANYL CITRATE 25 MCG: 50 INJECTION, SOLUTION INTRAMUSCULAR; INTRAVENOUS at 14:35

## 2021-08-30 RX ADMIN — MIDAZOLAM 1 MG: 1 INJECTION INTRAMUSCULAR; INTRAVENOUS at 14:37

## 2021-08-30 ASSESSMENT — PAIN SCALES - GENERAL
PAINLEVEL_OUTOF10: 0
PAINLEVEL_OUTOF10: 0

## 2021-08-30 NOTE — PROGRESS NOTES
1300:  Patient arrived ambulatory for MAMI with Dr. Susan Rivas. Patient states he has been NPO since last night. Patient is not on any blood thinners. PT RIGHTS AND RESPONSIBILITIES OFFERED TO PT.  Parents at bedside. IV fluid infusing. Blood work collected per order.                  __m__ Safety:       (Environmental)   [place] to environment   Ensure ID band is correct and in place/ allergy band as needed   Assess for fall risk   Initiate fall precautions as applicable (fall band, side rails, etc.)   Call light within reach   Bed in low position/ wheels locked    _m___ Pain:        Assess pain level and characteristics   Administer analgesics as ordered   Assess effectiveness of pain management and report to MD as needed    _m___ Knowledge Deficit:   Assess baseline knowledge   Provide teaching at level of understanding   Provide teaching via preferred learning method   Evaluate teaching effectiveness    _m___ Hemodynamic/Respiratory Status:       (Pre and Post Procedure Monitoring)   Assess/Monitor vital signs and LOC   Assess Baseline SpO2 prior to any sedation   Obtain weight/height   Assess vital signs/ LOC until patient meets discharge criteria   Monitor procedure site and notify MD of any issues

## 2021-08-30 NOTE — LETTER
[address]  [address] [facility] [address]  Phone: [phone]             August 30, 2021    Patient: Avis Perez   YOB: 1990   Date of Visit: 8/30/2021       To Whom It May Concern:    Gurwinder Eric was seen and treated in our facility  beginning 8/30/2021 . Please excuse for today. He may return to work on 8/31/2021.       Sincerely,       Dr. Matthew Calle MD, Cardiology       Signature:__________________________________

## 2021-08-30 NOTE — PROGRESS NOTES
1432: Dr. Ana M Rush at bedside. Ultrasound at bedside. Consent form verified. Timeout performed. 1433: Sedation medication started, patient tolerating well. 1439: Scope in. Vital signs as charted. 1446: Scope out. Patient tolerated well. Alert but drowsy at this time. Vitals as charted. Dr. Ana M Rush out to talk to parents. 1500: Patient alert and awake, beverage and snack provided. 1515: Vitals as charted. 1530: Patient awake and alert in room. Vitals as charted. 1550: AVS reviewed with patient and family, voiced understanding. Patient discharged in wheelchair.

## 2021-08-30 NOTE — H&P
[address] [address]  Sedation/Analgesia History & Physical    Pt Name: Rubi Israel  Account number: [de-identified]  MRN: [id number]  YOB: 1990  Provider Performing Procedure: Nate Foster MD MD [facility]  Primary Care Physician: Dmitry Baltazar  Date: 8/30/2021    PRE-PROCEDURE    Code Status: FULL CODE  Brief History/Pre-Procedure Diagnosis:   VSD AI      Consent: : I have discussed with the patient risks, benefits, and alternatives (and relevant risks, benefits, and side effects related to alternatives or not receiving care), and likelihood of the success. The patient and/or representative appear to understand and agree to proceed. The discussion encompasses risks, benefits, and side effects related to the alternatives and the risks related to not receiving the proposed care, treatment, and services. MEDICAL HISTORY  []ASHD/ANGINA/MI/CHF   []Hypertension  []Diabetes  []Hyperlipidemia  []Smoking  []Family Hx of ASHD  []Additional information:       has a past medical history of Bleeds easily ([address] Bruises easily, Thyroid disease, and VSD (ventricular septal defect and aortic arch hypoplasia. SURGICAL HISTORY   has a past surgical history that includes hip surgery (2003); Mandible surgery; External ear surgery; hernia repair; Eye surgery; and Dental surgery.   Additional information:       ALLERGIES   Allergies as of 08/30/2021    (No Known Allergies)     Additional information:       MEDICATIONS   Aspirin  [x] 81 mg  [] 325 mg  [] None  Antiplatelet drug therapy use last 5 days  []No []Yes  Coumadin Use Last 5 Days []No []Yes  Other anticoagulant use last 5 days  []No []Yes    Current Outpatient Medications:     Multiple Vitamins-Minerals (THERAPEUTIC MULTIVITAMIN-MINERALS) tablet, Take 1 tablet by mouth daily, Disp: , Rfl:     SYNTHROID 50 MCG tablet, Take 50 mcg by mouth daily , Disp: , Rfl:     ibuprofen (ADVIL;MOTRIN) 200 MG tablet, Take 200 mg by mouth every 6 hours as needed for Pain, Disp: , Rfl:     Current Facility-Administered Medications:     0.9 % sodium chloride infusion, , IntraVENous, Continuous, Hailey Sutton PA-C    0.9 % sodium chloride infusion, 25 mL, IntraVENous, PRN, Sylvester Morrissey PA-C    sodium chloride flush 0.9 % injection 5-40 mL, 5-40 mL, IntraVENous, 2 times per day, Sylvester Morrissey PA-C    sodium chloride flush 0.9 % injection 5-40 mL, 5-40 mL, IntraVENous, PRN, Sylvester Morrissey PA-C    fentaNYL (SUBLIMAZE) injection 25 mcg, 25 mcg, IntraVENous, PRN, [address], MD, 25 mcg at 08/30/21 1435    midazolam (VERSED) injection 1 mg, 1 mg, IntraVENous, PRN, [address], MD, 1 mg at 08/30/21 1437    flumazenil (ROMAZICON) injection 0.2 mg, 0.2 mg, IntraVENous, PRN, Zafar Easley MD    naloxone [facility]) injection 0.4 mg, 0.4 mg, IntraVENous, PRN, [address], MD  Prior to Admission medications    Medication Sig Start Date End Date Taking?  Authorizing Provider   Multiple Vitamins-Minerals (THERAPEUTIC MULTIVITAMIN-MINERALS) tablet Take 1 tablet by mouth daily    Historical Provider, MD   SYNTHROID 50 MCG tablet Take 50 mcg by mouth daily  8/22/17   Historical Provider, MD   ibuprofen (ADVIL;MOTRIN) 200 MG tablet Take 200 mg by mouth every 6 hours as needed for Pain    Historical Provider, MD     Additional information:       VITAL SIGNS   Vitals:    08/30/21 1445   BP: (!) 105/51   Pulse: 82   Resp:    Temp:    SpO2: 97%       PHYSICAL:   General: No acute distress  HEENT:  Unremarkable for age  Neck: without increased JVD, carotid pulses 2+ bilaterally without bruits  Heart: RRR, S1 & S2 WNL, S4 gallop, without murmurs or rubs    Lungs: Clear to auscultation    Abdomen: BS present, without HSM, masses, or tenderness    Extremities: without C,C,E.  Pulses 2+ bilaterally  Mental Status: Alert & Oriented        PLANNED PROCEDURE   []Cath  []PCI                []Pacemaker/AICD  [x]MAMI []Cardioversion []Peripheral angiography/PTA  []Other:      SEDATION  Planned agent:[x]Midazolam []Meperidine [x]Sublimaze []Morphine  []Diazepam  []Other:     ASA Classification:  []1 [x]2 []3 []4 []5  Class 1: A normal healthy patient  Class 2: Pt with mild to moderate systemic disease  Class 3: Severe systemic disease or disturbance  Class 4: Severe systemic disorders that are already life threatening. Class 5: Moribund pt with little chances of survival, for more than 24 hours. Mallampati I Airway Classification:   []1 [x]2 []3 []4    [x]Pre-procedure diagnostic studies complete and results available. Comment:    [x]Previous sedation/anesthesia experiences assessed. Comment:    [x]The patient is an appropriate candidate to undergo the planned procedure sedation and anesthesia. (Refer to nursing sedation/analgesia documentation record)  [x]Formulation and discussion of sedation/procedure plan, risks, and expectations with patient and/or responsible adult completed. [x]Patient examined immediately prior to the procedure.  (Refer to nursing sedation/analgesia documentation record)    Ntae Foster MD MD [facility]  Electronically signed 8/30/2021 at 3:23 PM

## 2021-09-08 ENCOUNTER — TELEPHONE (OUTPATIENT)
Dept: CARDIOLOGY CLINIC | Age: 31
End: 2021-09-08

## 2021-09-29 ENCOUNTER — HOSPITAL ENCOUNTER (OUTPATIENT)
Dept: INFUSION THERAPY | Age: 31
Discharge: HOME | End: 2021-09-29
Payer: COMMERCIAL

## 2021-09-29 ENCOUNTER — OFFICE VISIT (OUTPATIENT)
Dept: ONCOLOGY | Age: 31
End: 2021-09-29
Payer: COMMERCIAL

## 2021-09-29 VITALS
RESPIRATION RATE: 18 BRPM | HEIGHT: 64 IN | WEIGHT: 125 LBS | OXYGEN SATURATION: 100 % | TEMPERATURE: 98 F | DIASTOLIC BLOOD PRESSURE: 52 MMHG | SYSTOLIC BLOOD PRESSURE: 113 MMHG | HEART RATE: 74 BPM | BODY MASS INDEX: 21.34 KG/M2

## 2021-09-29 DIAGNOSIS — D69.6 THROMBOCYTOPENIA (HCC): Primary | ICD-10-CM

## 2021-09-29 DIAGNOSIS — D69.6 THROMBOCYTOPENIA (HCC): ICD-10-CM

## 2021-09-29 DIAGNOSIS — D69.1 PLATELET DISORDER (HCC): ICD-10-CM

## 2021-09-29 DIAGNOSIS — Q87.89: ICD-10-CM

## 2021-09-29 LAB
ABSOLUTE IMMATURE GRANULOCYTE: 0 THOU/MM3 (ref 0–0.07)
BASINOPHIL, AUTOMATED: 1 % (ref 0–3)
BASOPHILS ABSOLUTE: 0 THOU/MM3 (ref 0–0.1)
EOSINOPHILS ABSOLUTE: 0.3 THOU/MM3 (ref 0–0.4)
EOSINOPHILS RELATIVE PERCENT: 5 % (ref 0–4)
HCT VFR BLD CALC: 40.6 % (ref 42–52)
HEMOGLOBIN: 13.6 GM/DL (ref 14–18)
IMMATURE GRANULOCYTES: 0 %
LYMPHOCYTES # BLD: 36 % (ref 15–47)
LYMPHOCYTES ABSOLUTE: 2 THOU/MM3 (ref 1–4.8)
MCH RBC QN AUTO: 33.5 PG (ref 26–33)
MCHC RBC AUTO-ENTMCNC: 33.5 GM/DL (ref 32.2–35.5)
MCV RBC AUTO: 100 FL (ref 80–94)
MONOCYTES ABSOLUTE: 0.5 THOU/MM3 (ref 0.4–1.3)
MONOCYTES: 10 % (ref 0–12)
PDW BLD-RTO: 12.6 % (ref 11.5–14.5)
PLATELET # BLD: 107 THOU/MM3 (ref 130–400)
PMV BLD AUTO: 12.1 FL (ref 9.4–12.4)
RBC # BLD: 4.06 MILL/MM3 (ref 4.7–6.1)
SEG NEUTROPHILS: 48 % (ref 43–75)
SEGMENTED NEUTROPHILS ABSOLUTE COUNT: 2.6 THOU/MM3 (ref 1.8–7.7)
WBC # BLD: 5.4 THOU/MM3 (ref 4.8–10.8)

## 2021-09-29 PROCEDURE — 1036F TOBACCO NON-USER: CPT | Performed by: INTERNAL MEDICINE

## 2021-09-29 PROCEDURE — 99211 OFF/OP EST MAY X REQ PHY/QHP: CPT

## 2021-09-29 PROCEDURE — 85025 COMPLETE CBC W/AUTO DIFF WBC: CPT

## 2021-09-29 PROCEDURE — G8420 CALC BMI NORM PARAMETERS: HCPCS | Performed by: INTERNAL MEDICINE

## 2021-09-29 PROCEDURE — G8427 DOCREV CUR MEDS BY ELIG CLIN: HCPCS | Performed by: INTERNAL MEDICINE

## 2021-09-29 PROCEDURE — 36415 COLL VENOUS BLD VENIPUNCTURE: CPT

## 2021-09-29 PROCEDURE — 99213 OFFICE O/P EST LOW 20 MIN: CPT | Performed by: INTERNAL MEDICINE

## 2021-09-29 NOTE — PROGRESS NOTES
[facility] CANCER CENTER  CANCER NETWORK OF [facility]  [facility] [address] [facility]  [address] [address]  Dept: [phone]  Dept Fax: [phone]  Loc: [phone]    Subjective:      Chief Complaint: Adryan Hobbs is a 28 y.o. male with thrombocytopenia. The patient has previously been followed at [facility] in [facility] as well as the [facility] at [facility]. 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 The patient has required DDAVP and platelet transfusions as necessary for any specific procedures    HPI: Tami Caldera returns today for follow-up regarding his history of thrombocytopenia and platelet function disorder. On today's evaluation his general sense of wellbeing has been relatively stable. He has been undergoing cardiac evaluation by Dr. Britney Oneal. The patient recently underwent a MAMI for cardiac evaluation. He has not had fever, cough, shortness of breath or other signs of infection. The patient's bowel and bladder habits have been normal.  He has not seen blood in his stool or urine. The patient remains active with an ECOG performance status of level 0. PMH, SH, and FH:  I reviewed the patients medication list and allergy list as noted on the electronic medical record. The PMH, SH and FH were also reviewed as noted on the EMR. Review of Systems   Constitutional: Negative. HENT: Negative. Eyes: Negative. Respiratory: Negative. Cardiovascular: Negative. Gastrointestinal: Negative. Genitourinary: Negative. Musculoskeletal: Negative. Skin: Negative. Neurological: Negative. Hematological: Negative. Psychiatric/Behavioral: Negative.       Objective:   Physical Exam   Vitals:    09/29/21 1547   BP: (!) 113/52   Pulse: 74   Resp: 18   Temp: 98 °F (36.7 °C)   SpO2: 100%   Vitals reviewed and are stable. Constitutional: Well-developed. No acute distress. HENT: Normocephalic and atraumatic. Eyes: Pupils appear equal and reactive. Neck: Overall appearance is symmetrical. No identifiable masses. Pulmonary: Effort normal. No respiratory distress. Neurological: Alert and oriented to person, place, and time. Judgment and thought content mildly impaired. Psychiatric: Mood and affect appropriate for the clinical situation. Data Analysis: The following laboratory studies were reviewed with the patient today:    Hematology 9/29/2021 8/30/2021 9/30/2020   WBC 5.4 7.2 5.8   RBC 4.06 (L) 4.84 4.35 (L)   HGB 13.6 (L) 15.9 14.1   HCT 40.6 (L) 49.4 43.4    (H) 102.1 (H) 100 (H)   RDW 12.6  12.3    (L) 116 (L) 124 (L)     Assessment:   1. 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 Plan:   1. Monitor platelet count and for any signs of abnormal bleeding/bruising. 2. DDAVP and platelet transfusions for procedures as needed. 3. Follow up with primary care provider for general medical care. Mark Solis M.D. Medical Director: [facility]  Cancer [address] [facility], [address] [place] [address], [address] [place] [address], [address] [place] [address] of the [facility] at the [facility]      **This report has been created using voice recognition software. It may contain minor errors which are inherent in voice recognition technology. **

## 2021-10-19 ENCOUNTER — OFFICE VISIT (OUTPATIENT)
Dept: CARDIOLOGY CLINIC | Age: 31
End: 2021-10-19
Payer: COMMERCIAL

## 2021-10-19 VITALS
BODY MASS INDEX: 21 KG/M2 | SYSTOLIC BLOOD PRESSURE: 116 MMHG | HEART RATE: 82 BPM | DIASTOLIC BLOOD PRESSURE: 62 MMHG | WEIGHT: 123 LBS | HEIGHT: 64 IN

## 2021-10-19 DIAGNOSIS — I35.1 NONRHEUMATIC AORTIC VALVE INSUFFICIENCY: ICD-10-CM

## 2021-10-19 DIAGNOSIS — Q21.0 VSD (VENTRICULAR SEPTAL DEFECT): Primary | ICD-10-CM

## 2021-10-19 PROCEDURE — G8420 CALC BMI NORM PARAMETERS: HCPCS | Performed by: NUCLEAR MEDICINE

## 2021-10-19 PROCEDURE — G8484 FLU IMMUNIZE NO ADMIN: HCPCS | Performed by: NUCLEAR MEDICINE

## 2021-10-19 PROCEDURE — 99214 OFFICE O/P EST MOD 30 MIN: CPT | Performed by: NUCLEAR MEDICINE

## 2021-10-19 PROCEDURE — 1036F TOBACCO NON-USER: CPT | Performed by: NUCLEAR MEDICINE

## 2021-10-19 PROCEDURE — G8427 DOCREV CUR MEDS BY ELIG CLIN: HCPCS | Performed by: NUCLEAR MEDICINE

## 2021-10-19 NOTE — PROGRESS NOTES
[address] [place] [address]. [address] [facility],4Th Floor [address] [facility]  Dept: [phone]  Dept Fax: [phone]  Loc: [phone]    Visit Date: 10/19/2021    Aryan Hoang is a 28 y.o. male who presents todayfor:  Chief Complaint   Patient presents with    Check-Up    Cardiac Valve Problem    Ventricular Septal Defect     MAMI done   Moderate to severe AI as well inlet VSD  Denies changes in breathing  No obvious CHF  Here to discuss options   BP is stable   No dizziness  No syncope      HPI:  HPI  Past Medical History:   Diagnosis Date    Bleeds easily ([address]     Bruises easily     Thyroid disease     VSD (ventricular septal defect and aortic arch hypoplasia       Past Surgical History:   Procedure Laterality Date    DENTAL SURGERY      EXTERNAL EAR SURGERY      EYE SURGERY      HERNIA REPAIR      HIP SURGERY  2003    MANDIBLE SURGERY       Family History   Problem Relation Age of Onset    No Known Problems Mother     No Known Problems Father      Social History     Tobacco Use    Smoking status: Never Smoker    Smokeless tobacco: Never Used   Substance Use Topics    Alcohol use: No      Current Outpatient Medications   Medication Sig Dispense Refill    Multiple Vitamins-Minerals (THERAPEUTIC MULTIVITAMIN-MINERALS) tablet Take 1 tablet by mouth daily      SYNTHROID 50 MCG tablet Take 50 mcg by mouth daily       ibuprofen (ADVIL;MOTRIN) 200 MG tablet Take 200 mg by mouth every 6 hours as needed for Pain       No current facility-administered medications for this visit.      No Known Allergies  Health Maintenance   Topic Date Due    Hepatitis C screen  Never done    Varicella vaccine (1 of 2 - 2-dose childhood series) Never done    COVID-19 Vaccine (1) Never done    HIV screen  Never done    DTaP/Tdap/Td vaccine (1 - Tdap) Never done    Flu vaccine (1) Never done    Hepatitis A vaccine  Aged Out    Hepatitis B vaccine  Aged Out    Hib vaccine  Aged Out    Meningococcal (ACWY) vaccine  Aged Out    Pneumococcal 0-64 years Vaccine  Aged Out       Subjective:  Review of Systems  General:   No fever, no chills, No fatigue or weight loss  Pulmonary:    No dyspnea, no wheezing  Cardiac:    Denies recent chest pain,   GI:     No nausea or vomiting, no abdominal pain  Neuro:    No dizziness or light headedness,   Musculoskeletal:  No recent active issues  Extremities:   No edema, no obvious claudication       Objective:  Physical Exam  /62   Pulse 82   Ht 5' 4\" (1.626 m)   Wt 123 lb (55.8 kg)   BMI 21.11 kg/m²   General:   Well developed, well nourished  Lungs:   Clear to auscultation  Heart:    Normal S1 S2, Slight murmur. no rubs, no gallops  Abdomen:   Soft, non tender, no organomegalies, positive bowel sounds  Extremities:   No edema, no cyanosis, good peripheral pulses  Neurological:   Awake, alert, oriented. No obvious focal deficits  Musculoskelatal:  No obvious deformities    Assessment:      Diagnosis Orders   1. VSD (ventricular septal defect)     2. Nonrheumatic aortic valve insufficiency     as above   is concerning   Likely caused by the VSD    Plan:  No follow-ups on file. I have spent 25 minutes face to face with the patient. More than 50% of this time was spent counseling and coordinating care. Discussed at length   We will refer to [facility] for opinion regarding the VSD  And possible closing   Continue risk factor modification and medical management  Thank you for allowing me to participate in the care of your patient. Please don't hesitate to contact me regarding any further issues related to the patient care    Orders Placed:  No orders of the defined types were placed in this encounter. Medications Prescribed:  No orders of the defined types were placed in this encounter. Discussed use, benefit, and side effects of prescribed medications. All patient questions answered. Pt voicedunderstanding.  Instructed to continue current medications, diet and exercise. Continue risk factor modification and medical management. Patient agreed with treatment plan. Follow up as directed.     Electronically signedby Monster Ortiz MD on 10/19/2021 at 1:04 PM

## 2021-10-25 ENCOUNTER — TELEPHONE (OUTPATIENT)
Dept: CARDIOLOGY CLINIC | Age: 31
End: 2021-10-25

## 2021-10-25 DIAGNOSIS — Q21.0 VSD (VENTRICULAR SEPTAL DEFECT): Primary | ICD-10-CM

## 2021-10-25 NOTE — TELEPHONE ENCOUNTER
Patient notified. Referral placed. Called ECHO lab for disc of MAMI. Per Sharan Parson they don't do disc anymore. Sharan Parson will push MAMI through system.

## 2022-08-15 ENCOUNTER — TELEPHONE (OUTPATIENT)
Dept: CARDIOLOGY CLINIC | Age: 32
End: 2022-08-15

## 2022-09-28 ENCOUNTER — HOSPITAL ENCOUNTER (OUTPATIENT)
Dept: INFUSION THERAPY | Age: 32
Discharge: HOME | End: 2022-09-28
Payer: COMMERCIAL

## 2022-09-28 ENCOUNTER — OFFICE VISIT (OUTPATIENT)
Dept: ONCOLOGY | Age: 32
End: 2022-09-28
Payer: COMMERCIAL

## 2022-09-28 VITALS
RESPIRATION RATE: 18 BRPM | WEIGHT: 127.4 LBS | OXYGEN SATURATION: 99 % | SYSTOLIC BLOOD PRESSURE: 105 MMHG | DIASTOLIC BLOOD PRESSURE: 48 MMHG | BODY MASS INDEX: 21.75 KG/M2 | HEART RATE: 86 BPM | TEMPERATURE: 97.6 F | HEIGHT: 64 IN

## 2022-09-28 VITALS
RESPIRATION RATE: 18 BRPM | BODY MASS INDEX: 21.75 KG/M2 | TEMPERATURE: 97.6 F | SYSTOLIC BLOOD PRESSURE: 105 MMHG | HEART RATE: 86 BPM | DIASTOLIC BLOOD PRESSURE: 48 MMHG | HEIGHT: 64 IN | OXYGEN SATURATION: 99 % | WEIGHT: 127.4 LBS

## 2022-09-28 DIAGNOSIS — Q87.89: ICD-10-CM

## 2022-09-28 DIAGNOSIS — D69.6 THROMBOCYTOPENIA (HCC): ICD-10-CM

## 2022-09-28 DIAGNOSIS — D69.1 PLATELET DISORDER (HCC): ICD-10-CM

## 2022-09-28 DIAGNOSIS — D69.6 THROMBOCYTOPENIA (HCC): Primary | ICD-10-CM

## 2022-09-28 LAB
ABSOLUTE IMMATURE GRANULOCYTE: 0.01 THOU/MM3 (ref 0–0.07)
BASINOPHIL, AUTOMATED: 1 % (ref 0–3)
BASOPHILS ABSOLUTE: 0.1 THOU/MM3 (ref 0–0.1)
EOSINOPHILS ABSOLUTE: 0.5 THOU/MM3 (ref 0–0.4)
EOSINOPHILS RELATIVE PERCENT: 9 % (ref 0–4)
HCT VFR BLD CALC: 44.1 % (ref 42–52)
HEMOGLOBIN: 14.2 GM/DL (ref 14–18)
IMMATURE GRANULOCYTES: 0 %
LYMPHOCYTES # BLD: 33 % (ref 15–47)
LYMPHOCYTES ABSOLUTE: 1.9 THOU/MM3 (ref 1–4.8)
MCH RBC QN AUTO: 32.3 PG (ref 26–33)
MCHC RBC AUTO-ENTMCNC: 32.2 GM/DL (ref 32.2–35.5)
MCV RBC AUTO: 101 FL (ref 80–94)
MONOCYTES ABSOLUTE: 0.5 THOU/MM3 (ref 0.4–1.3)
MONOCYTES: 8 % (ref 0–12)
PDW BLD-RTO: 12.6 % (ref 11.5–14.5)
PLATELET # BLD: 113 THOU/MM3 (ref 130–400)
PMV BLD AUTO: 11.7 FL (ref 9.4–12.4)
RBC # BLD: 4.39 MILL/MM3 (ref 4.7–6.1)
SEG NEUTROPHILS: 49 % (ref 43–75)
SEGMENTED NEUTROPHILS ABSOLUTE COUNT: 2.8 THOU/MM3 (ref 1.8–7.7)
WBC # BLD: 5.8 THOU/MM3 (ref 4.8–10.8)

## 2022-09-28 PROCEDURE — 99213 OFFICE O/P EST LOW 20 MIN: CPT | Performed by: INTERNAL MEDICINE

## 2022-09-28 PROCEDURE — 99211 OFF/OP EST MAY X REQ PHY/QHP: CPT

## 2022-09-28 PROCEDURE — 85025 COMPLETE CBC W/AUTO DIFF WBC: CPT

## 2022-09-28 NOTE — PROGRESS NOTES
Chief Complaint   Patient presents with    Migraine     Has been suffering with migraines - gets at least 4-5 a week - lasting any where from an hour in a half to all day     Seizure     Had a bad seizure a month and a half ago- dislocated right knee and right femur - had to have steel leslie inserted -     1. Have you been to the ER, urgent care clinic since your last visit? Hospitalized since your last visit? Yes for fracture of femur     2. Have you seen or consulted any other health care providers outside of the [address] [place] [address] since your last visit? Include any pap smears or colon screening. Yes currently in [facility] for rehab. See Physician's progress note.

## 2022-09-28 NOTE — PROGRESS NOTES
[facility] CANCER CENTER  CANCER NETWORK OF [facility]  [facility] [address] [facility]  [address] [address]  Dept: [phone]  Dept Fax: [phone]  Loc: [phone]    Subjective:      Chief Complaint: Nikkie Baptiste is a 33 y.o. male with thrombocytopenia. The patient has previously been followed at [facility] in [place] as well as the [facility] at [facility]. 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 The patient has required DDAVP and platelet transfusions as necessary for any specific procedures    HPI: Loretta King is here today for follow-up regarding his history of thrombocytopenia and platelet dysfunction. On today's evaluation his general sense of wellbeing has been stable. He does have 2 lesions on his skin that his dermatologist is going to remove in the near future. One is involving his left arm and the other is on the top of his right foot. Neither of them have the appearance clearly of malignancy but I agree with the dermatologist that they should be removed. They are both relatively superficial and I do not believe that a platelet transfusion or DDAVP would be necessary in this clinical setting for this procedure. The patient feels well and has no specific complaints on his review of systems. He has not had fever, cough, shortness of breath or other signs of infection. The patient's bowel and bladder habits have been normal.  He has not seen blood in his stool or urine. The patient remains active with an ECOG performance status of level 0. PMH, SH, and FH:  I reviewed the patients medication list and allergy list as noted on the electronic medical record. The PMH, SH and FH were also reviewed as noted on the EMR. Review of Systems:  Constitutional: Negative. HENT: Negative. Eyes: Negative.     Respiratory: Negative. Cardiovascular: Negative. Gastrointestinal: Negative. Genitourinary: Negative. Musculoskeletal: Negative. Skin: Negative. Neurological: Negative. Hematological: Negative. Psychiatric/Behavioral: Negative. Objective:   Physical Exam   Vitals:    09/28/22 1548   BP: (!) 105/48   Pulse: 86   Resp: 18   Temp: 97.6 °F (36.4 °C)   SpO2: 99%   Vitals reviewed and are stable. Constitutional: Well-developed. No acute distress. HENT: Normocephalic and atraumatic. Eyes: Pupils appear equal and reactive. Neck: Overall appearance is symmetrical. No identifiable masses. Pulmonary: Effort normal. No respiratory distress. .  Neurological: Alert and oriented to person, place, and time. Judgment and thought content mildly impaired. Skin: Superficial skin lesions with appearance of moles involving the left forearm and dorsal aspect of the right foot that are scheduled to be surgically excised by the patient's dermatologist.  Psychiatric: Mood and affect appropriate for the clinical situation. Data Analysis: The following laboratory studies were reviewed with the patient today:    Hematology 9/28/2022 9/29/2021 8/30/2021   WBC 5.8 5.4 7.2   RBC 4.39 (L) 4.06 (L) 4.84   HGB 14.2 13.6 (L) 15.9   HCT 44.1 40.6 (L) 49.4    (H) 100 (H) 102.1 (H)   RDW 12.6 12.6     (L) 107 (L) 116 (L)     Assessment:   1. 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 Plan:   1. Monitor platelet count and for any signs of abnormal bleeding/bruising. 2. DDAVP and platelet transfusions for procedures as needed. 3. Follow up with primary care provider for general medical care. Xochilt Caballero M.D.                                                                          Medical Director: [facility]  Cancer [address] KENYONEdgar Pierre [facility], [address] [place] [address] [address] [facility], [address] [place] [address], [address] [place] [address], [address] [facility] [address] of the [facility] at the [facility]      **This report has been created using voice recognition software. It may contain minor errors which are inherent in voice recognition technology. **

## 2022-10-12 NOTE — PROGRESS NOTES
In preparation for their surgical procedure above patient was screened for Obstructive Sleep Apnea (SELWYN) using the STOP-Bang Questionnaire by the Pre-Admission Testing department. This is a pre-surgical screening tool for patient safety and serves as a recommendation, this WILL NOT cause cancellation of surgery. STOP-Bang Questionnaire  * Do you currently see a pulmonologist?  No     If yes STOP, do not complete. Patient follows with Dr.     1.  Do you snore loudly (able to be heard in the next room)? No    2. Do you often feel tired or sleepy during the daytime? No       3. Has anyone ever told you that you stop breathing during your sleep? No    4. Do you have or are you being treated for high blood pressure? No      5. BMI more than 35? BMI (Calculated): 21.8        No    6. Age over 48 years? 33 y.o. No    7. Neck Circumference greater than 17 inches for male or 16 inches for female? Measured           (visits only)            Not Applicable    8. Gender Male? Yes      TOTAL SCORE: 1    SELWYN - Low Risk : Yes to 0 - 2 questions  SELWYN - Intermediate Risk : Yes to 3 - 4 questions  SELWYN - High Risk : Yes to 5 - 8 questions    Adapted from:   STOP Questionnaire: A Tool to Screen Patients for Obstructive Sleep Apnea   EVELIO Almodovar.P.C., ECTOR De La Rosa.B.B.S., Jessica John M.D., Burke Sanches. Stacy Holcomb, Ph.D., ECTOR Maravilla.B.B.S., Manuel Bucio M.Sc., Ga Rasheed M.D., [facility]. EVELIO Brewster.P.C.    Anesthesiology 2008; 294:806-74 Copyright 2008, the [address] of Anesthesiologists, [facility] 37.   ----------------------------------------------------------------------------------------------------------------

## 2022-10-12 NOTE — PROGRESS NOTES
Patient states he didn't do EKG, spoke with Felix Vidal and Dr. Nathanael Ahumada' office and she said patient got his orders confused.  I called and left a message for patient's mother Dandre Watson to call back to make sure the patient gets the rest of his pre-op orders done

## 2022-10-12 NOTE — PROGRESS NOTES
Per patient her will have repeat CBC drawn 10/18    NPO after midnight  [employer] and drivers license  Wear comfortable clean clothing  Do not bring jewelry  Shower night before and morning of surgery with a liquid antibacterial soap  Bring list of medications with dosage and how often taken  Follow all instructions given by your physician   needed at discharge  Please limit to 2 visitors for surgery  You must have a responsible adult with you day of surgery and for 24 hours after surgery  Call [phone] for any questions

## 2022-10-13 NOTE — PROGRESS NOTES
Dr. Ella Loja cleared as moderate risk, information given to anesthesia.  Per Dr. Xavier Mccann ok to proceed at surgery center 10/19

## 2022-10-18 ENCOUNTER — HOSPITAL ENCOUNTER (OUTPATIENT)
Age: 32
Discharge: HOME | End: 2022-10-18
Payer: COMMERCIAL

## 2022-10-18 LAB
ANION GAP SERPL CALCULATED.3IONS-SCNC: 9 MEQ/L (ref 8–16)
BUN BLDV-MCNC: 13 MG/DL (ref 7–22)
CALCIUM SERPL-MCNC: 9.4 MG/DL (ref 8.5–10.5)
CHLORIDE BLD-SCNC: 105 MEQ/L (ref 98–111)
CO2: 26 MEQ/L (ref 23–33)
CREAT SERPL-MCNC: 1 MG/DL (ref 0.4–1.2)
EKG ATRIAL RATE: 73 BPM
EKG P AXIS: 55 DEGREES
EKG P-R INTERVAL: 128 MS
EKG Q-T INTERVAL: 418 MS
EKG QRS DURATION: 86 MS
EKG QTC CALCULATION (BAZETT): 460 MS
EKG R AXIS: 59 DEGREES
EKG T AXIS: 17 DEGREES
EKG VENTRICULAR RATE: 73 BPM
ERYTHROCYTE [DISTWIDTH] IN BLOOD BY AUTOMATED COUNT: 12.7 % (ref 11.5–14.5)
ERYTHROCYTE [DISTWIDTH] IN BLOOD BY AUTOMATED COUNT: 47 FL (ref 35–45)
GFR SERPL CREATININE-BSD FRML MDRD: > 60 ML/MIN/1.73M2
GLUCOSE BLD-MCNC: 85 MG/DL (ref 70–108)
HCT VFR BLD CALC: 45.2 % (ref 42–52)
HEMOGLOBIN: 14.9 GM/DL (ref 14–18)
MCH RBC QN AUTO: 33 PG (ref 26–33)
MCHC RBC AUTO-ENTMCNC: 33 GM/DL (ref 32.2–35.5)
MCV RBC AUTO: 100.2 FL (ref 80–94)
PLATELET # BLD: 110 THOU/MM3 (ref 130–400)
PMV BLD AUTO: 12.2 FL (ref 9.4–12.4)
POTASSIUM SERPL-SCNC: 4.1 MEQ/L (ref 3.5–5.2)
RBC # BLD: 4.51 MILL/MM3 (ref 4.7–6.1)
SODIUM BLD-SCNC: 140 MEQ/L (ref 135–145)
WBC # BLD: 5.6 THOU/MM3 (ref 4.8–10.8)

## 2022-10-18 PROCEDURE — 80048 BASIC METABOLIC PNL TOTAL CA: CPT

## 2022-10-18 PROCEDURE — 85027 COMPLETE CBC AUTOMATED: CPT

## 2022-10-18 PROCEDURE — 93005 ELECTROCARDIOGRAM TRACING: CPT | Performed by: SPECIALIST

## 2022-10-18 PROCEDURE — 93010 ELECTROCARDIOGRAM REPORT: CPT | Performed by: INTERNAL MEDICINE

## 2022-10-18 PROCEDURE — 36415 COLL VENOUS BLD VENIPUNCTURE: CPT

## 2022-10-19 ENCOUNTER — ANESTHESIA (OUTPATIENT)
Dept: OPERATING ROOM | Age: 32
End: 2022-10-19
Payer: COMMERCIAL

## 2022-10-19 ENCOUNTER — HOSPITAL ENCOUNTER (OUTPATIENT)
Age: 32
Setting detail: OUTPATIENT SURGERY
Discharge: HOME | End: 2022-10-19
Attending: SPECIALIST | Admitting: SPECIALIST
Payer: COMMERCIAL

## 2022-10-19 ENCOUNTER — ANESTHESIA EVENT (OUTPATIENT)
Dept: OPERATING ROOM | Age: 32
End: 2022-10-19
Payer: COMMERCIAL

## 2022-10-19 VITALS
OXYGEN SATURATION: 97 % | BODY MASS INDEX: 21.51 KG/M2 | SYSTOLIC BLOOD PRESSURE: 121 MMHG | DIASTOLIC BLOOD PRESSURE: 62 MMHG | WEIGHT: 126 LBS | RESPIRATION RATE: 18 BRPM | TEMPERATURE: 96.8 F | HEART RATE: 88 BPM | HEIGHT: 64 IN

## 2022-10-19 DIAGNOSIS — L98.9 FOOT LESION: ICD-10-CM

## 2022-10-19 DIAGNOSIS — G89.18 POST-OPERATIVE PAIN: Primary | ICD-10-CM

## 2022-10-19 PROCEDURE — 3600000012 HC SURGERY LEVEL 2 ADDTL 15MIN: Performed by: SPECIALIST

## 2022-10-19 PROCEDURE — 6360000002 HC RX W HCPCS: Performed by: NURSE ANESTHETIST, CERTIFIED REGISTERED

## 2022-10-19 PROCEDURE — 88305 TISSUE EXAM BY PATHOLOGIST: CPT

## 2022-10-19 PROCEDURE — 2500000003 HC RX 250 WO HCPCS: Performed by: SPECIALIST

## 2022-10-19 PROCEDURE — 7100000011 HC PHASE II RECOVERY - ADDTL 15 MIN: Performed by: SPECIALIST

## 2022-10-19 PROCEDURE — 3700000000 HC ANESTHESIA ATTENDED CARE: Performed by: SPECIALIST

## 2022-10-19 PROCEDURE — 2709999900 HC NON-CHARGEABLE SUPPLY: Performed by: SPECIALIST

## 2022-10-19 PROCEDURE — 7100000010 HC PHASE II RECOVERY - FIRST 15 MIN: Performed by: SPECIALIST

## 2022-10-19 PROCEDURE — 3600000002 HC SURGERY LEVEL 2 BASE: Performed by: SPECIALIST

## 2022-10-19 PROCEDURE — 2500000003 HC RX 250 WO HCPCS: Performed by: NURSE ANESTHETIST, CERTIFIED REGISTERED

## 2022-10-19 PROCEDURE — 6360000002 HC RX W HCPCS: Performed by: SPECIALIST

## 2022-10-19 PROCEDURE — 2580000003 HC RX 258: Performed by: SPECIALIST

## 2022-10-19 PROCEDURE — 3700000001 HC ADD 15 MINUTES (ANESTHESIA): Performed by: SPECIALIST

## 2022-10-19 RX ORDER — LIDOCAINE HYDROCHLORIDE 10 MG/ML
INJECTION, SOLUTION EPIDURAL; INFILTRATION; INTRACAUDAL; PERINEURAL PRN
Status: DISCONTINUED | OUTPATIENT
Start: 2022-10-19 | End: 2022-10-19 | Stop reason: SDUPTHER

## 2022-10-19 RX ORDER — SODIUM CHLORIDE 9 MG/ML
INJECTION, SOLUTION INTRAVENOUS CONTINUOUS
Status: DISCONTINUED | OUTPATIENT
Start: 2022-10-19 | End: 2022-10-19 | Stop reason: HOSPADM

## 2022-10-19 RX ORDER — MIDAZOLAM HYDROCHLORIDE 1 MG/ML
INJECTION INTRAMUSCULAR; INTRAVENOUS PRN
Status: DISCONTINUED | OUTPATIENT
Start: 2022-10-19 | End: 2022-10-19 | Stop reason: SDUPTHER

## 2022-10-19 RX ORDER — ACETAMINOPHEN AND CODEINE PHOSPHATE 300; 30 MG/1; MG/1
1 TABLET ORAL EVERY 6 HOURS PRN
Qty: 10 TABLET | Refills: 0 | Status: SHIPPED | OUTPATIENT
Start: 2022-10-19 | End: 2022-10-22

## 2022-10-19 RX ORDER — FENTANYL CITRATE 50 UG/ML
INJECTION, SOLUTION INTRAMUSCULAR; INTRAVENOUS PRN
Status: DISCONTINUED | OUTPATIENT
Start: 2022-10-19 | End: 2022-10-19 | Stop reason: SDUPTHER

## 2022-10-19 RX ORDER — PROPOFOL 10 MG/ML
INJECTION, EMULSION INTRAVENOUS PRN
Status: DISCONTINUED | OUTPATIENT
Start: 2022-10-19 | End: 2022-10-19 | Stop reason: SDUPTHER

## 2022-10-19 RX ORDER — LIDOCAINE HYDROCHLORIDE AND EPINEPHRINE 20; 5 MG/ML; UG/ML
INJECTION, SOLUTION EPIDURAL; INFILTRATION; INTRACAUDAL; PERINEURAL PRN
Status: DISCONTINUED | OUTPATIENT
Start: 2022-10-19 | End: 2022-10-19 | Stop reason: ALTCHOICE

## 2022-10-19 RX ADMIN — SODIUM CHLORIDE: 9 INJECTION, SOLUTION INTRAVENOUS at 10:55

## 2022-10-19 RX ADMIN — MIDAZOLAM 1 MG: 1 INJECTION INTRAMUSCULAR; INTRAVENOUS at 10:58

## 2022-10-19 RX ADMIN — PROPOFOL 30 MG: 10 INJECTION, EMULSION INTRAVENOUS at 11:17

## 2022-10-19 RX ADMIN — FENTANYL CITRATE 25 MCG: 50 INJECTION, SOLUTION INTRAMUSCULAR; INTRAVENOUS at 11:22

## 2022-10-19 RX ADMIN — FENTANYL CITRATE 25 MCG: 50 INJECTION, SOLUTION INTRAMUSCULAR; INTRAVENOUS at 11:31

## 2022-10-19 RX ADMIN — FENTANYL CITRATE 25 MCG: 50 INJECTION, SOLUTION INTRAMUSCULAR; INTRAVENOUS at 11:02

## 2022-10-19 RX ADMIN — LIDOCAINE HYDROCHLORIDE 50 MG: 10 INJECTION, SOLUTION EPIDURAL; INFILTRATION; INTRACAUDAL; PERINEURAL at 10:58

## 2022-10-19 RX ADMIN — FENTANYL CITRATE 25 MCG: 50 INJECTION, SOLUTION INTRAMUSCULAR; INTRAVENOUS at 11:08

## 2022-10-19 RX ADMIN — CEFAZOLIN 2000 MG: 10 INJECTION, POWDER, FOR SOLUTION INTRAVENOUS at 11:01

## 2022-10-19 RX ADMIN — PROPOFOL 20 MG: 10 INJECTION, EMULSION INTRAVENOUS at 11:03

## 2022-10-19 RX ADMIN — PROPOFOL 100 MG: 10 INJECTION, EMULSION INTRAVENOUS at 11:23

## 2022-10-19 RX ADMIN — PROPOFOL 30 MG: 10 INJECTION, EMULSION INTRAVENOUS at 11:08

## 2022-10-19 ASSESSMENT — PAIN - FUNCTIONAL ASSESSMENT: PAIN_FUNCTIONAL_ASSESSMENT: NONE - DENIES PAIN

## 2022-10-19 NOTE — DISCHARGE INSTRUCTIONS
POST OPERATIVE INSTRUCTION SHEET  SKIN TUMOR/LESION REMOVAL          Activity:    No strenuous activity for 48 hours  No activity that stresses the suture closure/incision  Regular diet; Unless operation of lip- then clear liquids for 48 hours (Sip from a cup: do not use a straw)  ABSOLUTELY NO NICOTINE OF ANY TYPE    Wound Care:  Steri-strips are in place on your left arm, you should leave them over incision until they fall off. If they fall off prior to your follow up appointment, cover incision with a band-aid  Leave dressing on right foot in place. Do not remove. Do not get wet. Dermabond was used on your upper thigh, do not scrub, rub or pick at adhesive glue    Limitations:  No swimming, hot tub, sauna or soaking in a bathtub    Prescriptions: Take exactly as prescribed    Follow-Up:  Call office for an appointment on Tuesday, 10/25/2022 unless otherwise instructed by Dr. Sandra Powell our office if you experience any of the following:   Develop a fever (temperature is greater than 100.5F)   Develop redness greater than 1 cm around incision or red streaks up extremity   Have any excess bleeding/ increased drainage or swelling at the incision site    *Note:  Your pathology results will be reviewed with you at your scheduled follow-up appointment.      *A prescription for Tylenol #3 has been sent to your pharmacy  *You may resume Advil on Friday

## 2022-10-19 NOTE — PROGRESS NOTES
1151- Pt arrived to PACU phase 2, Flower Marie RN at bedside for report. Pt has ace wrap dressing to left foot, dressing dry and itnact, pt has steri strips to left arm, and glue to right groin all dressings dry and intact. 1153- Dr Eula Cornejo in room  1154- Warm blankets and snack given. 1239- IV removed pt getting dressed. 1248- Pt given discharge instructions verbalized understanding. Mom and Dad at bedside. [place] [address] checked with Dr Eula Cornejo about weight bearing pt to stay off feet as much as possible.    1253- Pt discharged taken to car in wheelchair with this RN

## 2022-10-19 NOTE — ANESTHESIA POSTPROCEDURE EVALUATION
Department of Anesthesiology  Postprocedure Note    Patient: Tamy Charles  MRN: [id number]  YOB: 1990  Date of evaluation: 10/19/2022      Procedure Summary     Date: 10/19/22 Room / Location: [address] [place] [address] 04 / 138 [facility] [place]    Anesthesia Start: 1055 Anesthesia Stop: 4080    Procedure: Excision Lesion Right Point Lay Foot with Skin Graft & Left Anterior Elbow (Foot) Diagnosis:       Foot lesion      (Foot lesion [L98.9])    Surgeons: Tracey Beauchamp MD Responsible Provider: Shade Newton DO    Anesthesia Type: MAC ASA Status: 3          Anesthesia Type: No value filed.     Susanna Phase I:      Susanna Phase II: Susanna Score: 10      Anesthesia Post Evaluation    Patient location during evaluation: PACU  Patient participation: complete - patient participated  Level of consciousness: awake  Airway patency: patent  Nausea & Vomiting: no nausea  Complications: no  Cardiovascular status: hemodynamically stable  Respiratory status: acceptable  Hydration status: stable

## 2022-10-19 NOTE — PROGRESS NOTES
Dressing =  Right upper thigh- histocryl  Right foot-bacitracin, xeroform, gauze, kerlix,ace  Left arm-

## 2022-10-19 NOTE — ANESTHESIA PRE PROCEDURE
Department of Anesthesiology  Preprocedure Note       Name:  Juliann Jones   Age:  33 y.o.  :  1990                                          MRN:  [id number]         Date:  10/19/2022      Surgeon: Pelon Rob):  Glenna Kenyon MD    Procedure: Procedure(s):  Excision Lesion Right [place] Foot with Skin Graft & Left Anterior Elbow    Medications prior to admission:   Prior to Admission medications    Medication Sig Start Date End Date Taking? Authorizing Provider   Multiple Vitamins-Minerals (THERAPEUTIC MULTIVITAMIN-MINERALS) tablet Take 1 tablet by mouth daily    Historical Provider, MD   SYNTHROID 50 MCG tablet Take 50 mcg by mouth daily  17   Historical Provider, MD   ibuprofen (ADVIL;MOTRIN) 200 MG tablet Take 200 mg by mouth every 6 hours as needed for Pain    Historical Provider, MD       Current medications:    Current Facility-Administered Medications   Medication Dose Route Frequency Provider Last Rate Last Admin    0.9 % sodium chloride infusion   IntraVENous Continuous Glenna Kenyon MD        ceFAZolin (ANCEF) 2000 mg in dextrose 5 % 50 mL IVPB  2,000 mg IntraVENous Once Glenna Kenyon MD           Allergies:  No Known Allergies    Problem List:    Patient Active Problem List   Diagnosis Code    Platelet disorder ([facility] D69.1    Thrombocytopenia ([facility] D69.6    Opitz-Pedro syndrome Q87.89       Past Medical History:        Diagnosis Date    Bleeds easily ([address]     Bruises easily     CAD (coronary artery disease)     Thyroid disease     VSD (ventricular septal defect and aortic arch hypoplasia        Past Surgical History:        Procedure Laterality Date    DENTAL SURGERY      EXTERNAL EAR SURGERY      EYE SURGERY      on muscle    HERNIA REPAIR      HIP SURGERY  2003    MANDIBLE SURGERY         Social History:    Social History     Tobacco Use    Smoking status: Never    Smokeless tobacco: Never   Substance Use Topics    Alcohol use:  No Counseling given: Not Answered      Vital Signs (Current):   Vitals:    10/12/22 1217   Weight: 127 lb (57.6 kg)   Height: 5' 4\" (1.626 m)                                              BP Readings from Last 3 Encounters:   09/28/22 (!) 105/48   09/28/22 (!) 105/48   10/19/21 116/62       NPO Status: Time of last liquid consumption: 0800 (sip of water with medication )                        Time of last solid consumption: 1800                        Date of last liquid consumption: 10/19/22                        Date of last solid food consumption: 10/18/22    BMI:   Wt Readings from Last 3 Encounters:   10/12/22 127 lb (57.6 kg)   09/28/22 127 lb 6.4 oz (57.8 kg)   09/28/22 127 lb 6.4 oz (57.8 kg)     Body mass index is 21.8 kg/m². CBC:   Lab Results   Component Value Date/Time    WBC 5.6 10/18/2022 06:29 AM    RBC 4.51 10/18/2022 06:29 AM    HGB 14.9 10/18/2022 06:29 AM    HCT 45.2 10/18/2022 06:29 AM    .2 10/18/2022 06:29 AM    RDW 12.6 09/28/2022 03:53 PM     10/18/2022 06:29 AM       CMP:   Lab Results   Component Value Date/Time     10/18/2022 06:29 AM    K 4.1 10/18/2022 06:29 AM     10/18/2022 06:29 AM    CO2 26 10/18/2022 06:29 AM    BUN 13 10/18/2022 06:29 AM    CREATININE 1.0 10/18/2022 06:29 AM    LABGLOM >60 10/18/2022 06:29 AM    GLUCOSE 85 10/18/2022 06:29 AM    CALCIUM 9.4 10/18/2022 06:29 AM       POC Tests: No results for input(s): POCGLU, POCNA, POCK, POCCL, POCBUN, POCHEMO, POCHCT in the last 72 hours.     Coags:   Lab Results   Component Value Date/Time    INR 0.99 09/11/2017 02:49 PM    APTT 36.4 09/11/2017 02:49 PM       HCG (If Applicable): No results found for: PREGTESTUR, PREGSERUM, HCG, HCGQUANT     ABGs: No results found for: PHART, PO2ART, ZTN9HMA, ZPP2DKV, BEART, C8XSRCHM     Type & Screen (If Applicable):  No results found for: LABABO, LABRH    Drug/Infectious Status (If Applicable):  No results found for: HIV, HEPCAB    COVID-19 Screening (If Applicable): No results found for: COVID19        Anesthesia Evaluation  Patient summary reviewed and Nursing notes reviewed  Airway: Mallampati: II          Dental:          Pulmonary: breath sounds clear to auscultation                             Cardiovascular:  Exercise tolerance: good (>4 METS),   (+) valvular problems/murmurs:, murmur,       ECG reviewed  Rhythm: regular  Rate: normal  Echocardiogram reviewed               ROS comment: VSD  No recent cardio-pulmonary issues     Neuro/Psych:               GI/Hepatic/Renal:             Endo/Other:    (+) blood dyscrasia::., .                  ROS comment: Hemophilia  Has seen hematologists lately and had no pre-op recommendations  Abdominal:             Vascular: Other Findings:           Anesthesia Plan      MAC     ASA 3       Induction: intravenous. MIPS: Postoperative opioids intended and Prophylactic antiemetics administered. Anesthetic plan and risks discussed with patient, mother and father. Plan discussed with CRNA.                     [address] Fischer,    10/19/2022

## 2022-10-19 NOTE — OP NOTE
Operative Note    Patient name: Tamy Charles             Medical Record Number: [id number]    Primary Care Physician: Kelvin Daija Galicia 1990    Date of Procedure: 10/19/2022    Pre-operative Diagnosis:  1.  1.4cm right dorsal foot pigmented changing lesion of unknown nature       2.  6mm pigmented changing lesion of left anterior elbow/lateral arm    Post-operative Diagnosis: Same    Procedure Performed:  1.  2.5cm excision of right dorsal foot pigmented changing lesion creating a 5cm2 defect that was repaired with a full thickness skin graft (5 cm2) (CPT 25346 & 28848)      2.  2.6cm excision and 2.6cm intermediate closure of left anterior elbow/lateral arm pigmented lesion (CPT 05040 & 06969)    Surgeons/Assistants: MD Trisha Foy PA-C    Estimated Blood Loss: 5ml     Complications: none immediately appreciated    Procedure: With the patient lying in the supine position and under adequate anesthesia per the anesthesia team.   Local anesthesia consisting of 19 ml of 1% Lidocaine 1:100,000 with epinephrine solution of the donor site of the right inguinal area as well as the left arm and right foot pigmented lesions. The areas were prepped and draped in the standard surgical fashion. The right foot pigmented lesion was excised at 2.5cm diameter with macroscopically clear margins. This was sent for permanent pathologic evaluation. The patient has very thin skin and a 5cm2 defect which could not be closed primarily, therefore a full thickness skin graft was taken. It was defatted and secured in position with a 3-0 silk suture tie and then a 5-0 fast absorbable suture was placed in a simple running fashion. A Bacitracin Xeroform and moist cotton ball tie over bolster was secured using the tails of the silk sutures. The donor site was closed with a 3-0 Monocryl suture placed in interrupted buried fashion and then Histoacryl respectively.   The left arm pigmented lesion was excised with 2.6cm ellipse, this was undermined and closed in intermediate fashion with 3-0 & 4-0 Monocryl suture. Benzoin/steristrips were applied as final dressing. The patient tolerated the procedure well and remained hemodynamically stable throughout the procedure and was quite comfortable throughout the operative course.     Clinical staging for cancer cases:  Ct  Cn  Cm    Jim Mike MD  Electronically signed by me on 10/19/2022 at 12:10 PM Operative Note      Patient: Gabrielle Liang  YOB: 1990  MRN: [id number]    Date of Procedure: 10/19/2022    Pre-Op Diagnosis: Foot lesion [L98.9]    Post-Op Diagnosis: Same       Procedure(s):  Excision Lesion Right [place] Foot with Skin Graft & Left Anterior Elbow    Surgeon(s):  Jim Mike MD    Assistant:   Physician Assistant: Deborah Flanagan PA-C    Anesthesia: Monitor Anesthesia Care    Estimated Blood Loss (mL): Minimal    Complications: None    Specimens:   ID Type Source Tests Collected by Time Destination   A :  Tissue Foot SURGICAL PATHOLOGY Jim Mike MD 10/19/2022 6942    B :  Tissue Elbow SURGICAL PATHOLOGY Jim Mike MD 10/19/2022 1008        Implants:  * No implants in log *      Drains: * No LDAs found *    Findings: 1.  1.4cm right dorsal foot pigmented changing lesion of unknown nature       2.  6mm pigmented changing lesion of left anterior elbow/lateral arm      Detailed Description of Procedure:   1.  2.5cm excision of right dorsal foot pigmented changing lesion creating a 5cm2 defect that was repaired with a full thickness skin graft (5 cm2) (CPT 53467 & 81527)      2.  2.6cm excision and 2.6cm intermediate closure of left anterior elbow/lateral arm pigmented lesion (CPT 83774 & 77192)    Electronically signed by Jim Mike MD on 10/19/2022 at 12:10 PM

## 2022-10-19 NOTE — H&P
[address] [address]  History and Physical Update    Pt Name: Thang Fortune  MRN: [id number]  YOB: 1990  Date of evaluation: 10/19/2022    I have examined the patient and reviewed the H&P/Consult and there are no changes to the patient or plans.       Jennifer Mcdaniels MD  Electronically signed 10/19/2022 at 10:51 AM

## 2022-11-22 ENCOUNTER — OFFICE VISIT (OUTPATIENT)
Dept: CARDIOLOGY CLINIC | Age: 32
End: 2022-11-22
Payer: COMMERCIAL

## 2022-11-22 VITALS
HEART RATE: 82 BPM | WEIGHT: 130 LBS | DIASTOLIC BLOOD PRESSURE: 60 MMHG | HEIGHT: 64 IN | BODY MASS INDEX: 22.2 KG/M2 | SYSTOLIC BLOOD PRESSURE: 120 MMHG

## 2022-11-22 DIAGNOSIS — I35.1 NONRHEUMATIC AORTIC VALVE INSUFFICIENCY: Primary | ICD-10-CM

## 2022-11-22 PROCEDURE — 99213 OFFICE O/P EST LOW 20 MIN: CPT | Performed by: NUCLEAR MEDICINE

## 2022-11-22 NOTE — PROGRESS NOTES
[address] [address] [address] [facility],[address] [address] [facility]  Dept: [phone]  Dept Fax: [phone]  Loc: [phone]    Visit Date: 11/22/2022    Lois Garcia is a 33 y.o. male who presents todayfor:  Chief Complaint   Patient presents with    Check-Up    Valvular Heart Disease     Known AI and VSD  Bicuspid AV   Seen at [facility] in the congenital clinic  Was told to be monitored for now   Some baseline dyspnea  No chest pain  No dizziness  No syncope      HPI:  HPI  Past Medical History:   Diagnosis Date    Bleeds easily ([address]     Bruises easily     CAD (coronary artery disease)     Thyroid disease     VSD (ventricular septal defect and aortic arch hypoplasia       Past Surgical History:   Procedure Laterality Date    DENTAL SURGERY      EXTERNAL EAR SURGERY      EYE SURGERY      on muscle    FOOT SURGERY N/A 10/19/2022    Excision Lesion Right [place] Foot with Skin Graft & Left Anterior Elbow performed by Solange Lucas MD at [address] [place] [address]  10/19/2022    mole removed from foot and arm. with skin graft from thigh to foot,.      Family History   Problem Relation Age of Onset    No Known Problems Mother     No Known Problems Father     Diabetes Maternal Uncle     High Blood Pressure Maternal Grandmother     Heart Disease Maternal Grandmother     Cancer Maternal Grandmother     Stroke Neg Hx      Social History     Tobacco Use    Smoking status: Never    Smokeless tobacco: Never   Substance Use Topics    Alcohol use: No      Current Outpatient Medications   Medication Sig Dispense Refill    Multiple Vitamins-Minerals (THERAPEUTIC MULTIVITAMIN-MINERALS) tablet Take 1 tablet by mouth daily      SYNTHROID 50 MCG tablet Take 50 mcg by mouth daily       ibuprofen (ADVIL;MOTRIN) 200 MG tablet Take 200 mg by mouth every 6 hours as needed for Pain       No current facility-administered medications for this visit. No Known Allergies  Health Maintenance   Topic Date Due    COVID-19 Vaccine (1) Never done    Varicella vaccine (1 of 2 - 2-dose childhood series) Never done    Depression Screen  Never done    HIV screen  Never done    Hepatitis C screen  Never done    DTaP/Tdap/Td vaccine (1 - Tdap) Never done    Flu vaccine (1) Never done    Hepatitis A vaccine  Aged Out    Hib vaccine  Aged Out    Meningococcal (ACWY) vaccine  Aged Out    Pneumococcal 0-64 years Vaccine  Aged Out       Subjective:  Review of Systems  General:   No fever, no chills, No fatigue or weight loss  Pulmonary:    No dyspnea, no wheezing  Cardiac:    Denies recent chest pain,   GI:     No nausea or vomiting, no abdominal pain  Neuro:    No dizziness or light headedness,   Musculoskeletal:  No recent active issues  Extremities:   No edema, no obvious claudication     Objective:  Physical Exam  /60   Pulse 82   Ht 5' 4\" (1.626 m)   Wt 130 lb (59 kg) Comment: weight per patient. unable to stand on scale. BMI 22.31 kg/m²   General:   Well developed, well nourished  Lungs:   Clear to auscultation  Heart:    Normal S1 S2, Systolic murmur. no rubs, no gallops  Abdomen:   Soft, non tender, no organomegalies, positive bowel sounds  Extremities:   No edema, no cyanosis, good peripheral pulses  Neurological:   Awake, alert, oriented. No obvious focal deficits  Musculoskelatal:  No obvious deformities    Assessment:      Diagnosis Orders   1. Nonrheumatic aortic valve insufficiency        As above  Following his VSD and AI at [address] [place]:  No follow-ups on file. Discussed  Echo   Continue risk factor modification and medical management  Thank you for allowing me to participate in the care of your patient. Please don't hesitate to contact me regarding any further issues related to the patient care      Orders Placed:  No orders of the defined types were placed in this encounter.       Medications Prescribed:  No orders of the defined types were placed in this encounter. Discussed use, benefit, and side effects of prescribed medications. All patient questions answered. Pt voicedunderstanding. Instructed to continue current medications, diet and exercise. Continue risk factor modification and medical management. Patient agreed with treatment plan. Follow up as directed.     Electronically signedby Soha Mensah MD on 11/22/2022 at 2:08 PM

## 2022-12-12 ENCOUNTER — HOSPITAL ENCOUNTER (OUTPATIENT)
Age: 32
Discharge: HOME | End: 2022-12-12
Payer: COMMERCIAL

## 2022-12-12 LAB — TSH SERPL DL<=0.05 MIU/L-ACNC: 3.08 UIU/ML (ref 0.4–4.2)

## 2022-12-12 PROCEDURE — 84443 ASSAY THYROID STIM HORMONE: CPT

## 2023-03-16 ENCOUNTER — HOSPITAL ENCOUNTER (OUTPATIENT)
Dept: PEDIATRICS | Age: 33
Discharge: HOME | End: 2023-03-16
Payer: COMMERCIAL

## 2023-03-16 VITALS
SYSTOLIC BLOOD PRESSURE: 105 MMHG | BODY MASS INDEX: 21.27 KG/M2 | TEMPERATURE: 97.6 F | OXYGEN SATURATION: 99 % | WEIGHT: 124.6 LBS | DIASTOLIC BLOOD PRESSURE: 52 MMHG | RESPIRATION RATE: 16 BRPM | HEART RATE: 83 BPM | HEIGHT: 64 IN

## 2023-03-16 DIAGNOSIS — Q25.42 VSD (VENTRICULAR SEPTAL DEFECT AND AORTIC ARCH HYPOPLASIA: Primary | ICD-10-CM

## 2023-03-16 DIAGNOSIS — Q21.0 VSD (VENTRICULAR SEPTAL DEFECT AND AORTIC ARCH HYPOPLASIA: Primary | ICD-10-CM

## 2023-03-16 DIAGNOSIS — I77.810 AORTIC ROOT DILATION (HCC): ICD-10-CM

## 2023-03-16 LAB
EKG ATRIAL RATE: 70 BPM
EKG P AXIS: 77 DEGREES
EKG P-R INTERVAL: 136 MS
EKG Q-T INTERVAL: 428 MS
EKG QRS DURATION: 88 MS
EKG QTC CALCULATION (BAZETT): 462 MS
EKG R AXIS: 78 DEGREES
EKG T AXIS: 59 DEGREES
EKG VENTRICULAR RATE: 70 BPM

## 2023-03-16 PROCEDURE — 99212 OFFICE O/P EST SF 10 MIN: CPT

## 2023-03-16 PROCEDURE — 93005 ELECTROCARDIOGRAM TRACING: CPT | Performed by: INTERNAL MEDICINE

## 2023-03-16 PROCEDURE — 93303 ECHO TRANSTHORACIC: CPT

## 2023-03-16 PROCEDURE — 93325 DOPPLER ECHO COLOR FLOW MAPG: CPT

## 2023-03-16 PROCEDURE — 93320 DOPPLER ECHO COMPLETE: CPT

## 2023-03-16 NOTE — DISCHARGE INSTRUCTIONS
Contact information: If you need to reach Dr. Joselin Sanchez for questions or concerns please call our office at [phone] (M-F 8am-5pm). After hours or on the weekends call [phone] and ask for the Adult Cardiologist on call.    Continue care with your PCP  You will have an EKG/ECHO @ return visit  No antibiotic is needed prior to any dental work  Return visit is scheduled in 1 year

## 2023-03-16 NOTE — PLAN OF CARE
Provider discussed disease process, treatment plan, medications,and discharge instructions. Patient and parents agrees with plan. Any questions were answered. Care plan reviewed with patient and parents.

## 2023-03-16 NOTE — PROGRESS NOTES
DIAGNOSES:  1. VSD - small and not hemodynamically significant  2. 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 Aortic root dilation (3.5 cm on MRA 2021)    HPI:  Lois Garcia is a 28 y.o. male with VSD, abnormal aortic valve (possibly unicuspid) with mild to moderate AR and mild aortic root dilation. He was last seen at [facility] in the [address] clinic in 2014 by Dr. Mushtaq Abebe. [employer] Antony was born at 31 weeks. He was diagnosed around the age of 13 yo at a routine pediatrics appointment. He never required cardiac surgery although has had multiple orthopedic surgeries. He was an active child but was not as active as his friends more due to depth perception issues. He will walk the dogs on a 4.5-5 mile walk daily. 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 He denies CP, SOB, palpitations, lightheadedness, dizziness, syncope, LE edema, orthopnea or PND.     Past Medical History:   Diagnosis Date    Anemia     Aortic root dilation (HCC)     Bleeds easily (HCC)     Bruises easily     CAD (coronary artery disease)     Hemophilia (HCC)     Needs platelets for every surgery    Thyroid disease     Hypothyroidism    VSD (ventricular septal defect and aortic arch hypoplasia      Past Surgical History:   Procedure Laterality Date    DENTAL SURGERY      multiple teeth excised, middle school age    EYE SURGERY Bilateral 1993    on muscle    FEMUR FRACTURE SURGERY Right     age 11 YO    FOOT SURGERY N/A 10/19/2022    Excision Lesion Right [place] Foot with Skin Graft & Left Anterior Elbow [facility] [address] [facility]  [facility]    inguinal hernia    HIP SURGERY  2003    HYPOSPADIAS CORRECTION      age 4 YO    [address]      Age 17    MOLE REMOVAL  10/19/2022    mole removed from foot and arm. with skin graft from thigh to foot,.    TYMPANIC MEMBRANE REPAIR Right     age 9-12    TYMPANOSTOMY TUBE PLACEMENT      3 sets, starting age3, last set at age 9 YO Social History     Socioeconomic History    Marital status: Single     Spouse name: Not on file    Number of children: Not on file    Years of education: Not on file    Highest education level: Not on file   Occupational History    Not on file   Tobacco Use    Smoking status: Never     Passive exposure: Current    Smokeless tobacco: Never    Tobacco comments:     Dad smokes outside   Vaping Use    Vaping Use: Never used   Substance and Sexual Activity    Alcohol use: No    Drug use: No    Sexual activity: Not on file   Other Topics Concern    Not on file   Social History Narrative    Not on file     Social Determinants of Health     Financial Resource Strain: Not on file   Food Insecurity: Not on file   Transportation Needs: Not on file   Physical Activity: Not on file   Stress: Not on file   Social Connections: Not on file   Intimate Partner Violence: Not on file   Housing Stability: Not on file         Family History   Problem Relation Age of Onset    Heart Disease - Other Maternal Grandmother    Hypertension Maternal Grandmother    Breast Cancer Maternal Grandmother    Skin Cancer Maternal Grandmother     No Known Allergies  Current Outpatient Medications   Medication Sig Dispense Refill    Multiple Vitamins-Minerals (THERAPEUTIC MULTIVITAMIN-MINERALS) tablet Take 1 tablet by mouth daily      SYNTHROID 50 MCG tablet Take 50 mcg by mouth daily       ibuprofen (ADVIL;MOTRIN) 200 MG tablet Take 200 mg by mouth every 6 hours as needed for Pain       No current facility-administered medications for this encounter. Physical Exam:   BP (!) 105/52 (Site: Right Upper Arm, Position: Sitting, Cuff Size: Large Adult) Comment: map 75  Pulse 83   Temp 97.6 °F (36.4 °C) (Skin)   Resp 16   Ht 5' 4.37\" (1.635 m)   Wt 124 lb 9.6 oz (56.5 kg)   SpO2 99%   BMI 21.14 kg/m²   General: The patient is awake, alert, and oriented. he is in no acute distress.   HEENT: Extraocular muscles are intact bilateral. Neck is supple. No masses. No carotid bruits were appreciated on auscultation. Lungs: Clear to auscultation bilateral without focal wheezes or crackles. Cardiovascuar: Regular rate and rhythm. There is a soft diastolic murmur present. No systolic murmurs, rubs or gallops. Abdomen: Soft, nontender, nondistended. Bowel sounds present. No hepatosplenomegaly or other masses. Extremities: No clubbing, cyanosis, or edema. Radial pulses are 2+ bilateral.  Skin: No rashes, excoriations, ulcerations, wounds. Psychiatric: Affect and mood appear normal.  Neuro: Cranial nerves II-XII grossly intact. No focal deficits were noted. DIAGNOSTIC DATA     ECG/Monitor:  NSR, normal ECG     ECHO 3/16/2023   Normal right ventricular size and function. Left ventricle size is normal.   Ejection fraction is visually estimated at 60%. Bicommisural aortic valve. Thickened and calcified leaflets. Mild stenosis   and mild to moderate regurgitation with a posteriorly directed jet. No   holodiastolic flow reversal in descending aorta. The ascending, transverse and descending aorta appear left-sided with no   evidence of dilation, coarctation or aneurysm. ECHO:  Date: 3/7/14, [address]  1. Small apical muscular ventricular septal defect with left-to-right shunting. 2. Aortic valve abnormality with no aortic stenosis. 3. Mild aortic regurgitation. 4. Mildly dilated aortic annulus and root. 5. Normal chamber sizes. 6. Normal right and left ventricular systolic function. 7. No pericardial effusion. MAMI:  Date: 8/30/21, [facility]  Ejection fraction is visually estimated at 60%. Overall left ventricular function is normal.    Small membranous ventricular septal defect with left-to-right shunt was    visualized. Aortic valve appears bicuspid. Moderate aortic regurgitation is noted    CARDIAC MRI/MRA: Date: 12/9/2022  Normal biventricular systolic function mild to moderate aortic valve regurgitation.   No hemodynamically significant shunt. Maximum aortic dimension is 3.5 cm at the sinuses. LEFT VENTRICLE: LV cavity size is normal. LV systolic function is normal. Quantitative LVEF 60 %. VIABILITY: Late gadolinium enhancement imaging demonstrate no significant scar. RIGHT VENTRICLE: RV cavity size is normal. RV systolic function is normal. Quantitative RVEF 61 %. LV/RV SEPTUM: No large shunt identified on first pass perfusion. Difficult to exclude a small ventricular septal defect, particularly near the LVOT. Qp:Qs = 1.1 by stroke volumes. LEFT ATRIUM: LA cavity size is normal.  RIGHT ATRIUM: RA cavity size is normal.  PERICARDIUM: Pericardium is normal.  AORTIC VALVE: There is mild-moderate aortic regurgitation. Aortic regurgitant fraction 26 % and regurgitant volume 13 ml (may be underestimated due to eccentric jet). MITRAL VALVE: Mitral valve leaflets are normal. There is trivial mitral regurgitation. TRICUSPID VALVE: Tricuspid valve leaflets are normal. Tricuspid regurgitation is present but was not (could not be) quantified. Jose Schmidt ----------------------------------------------------------------------------------------------------.    EVALUATION DETAILS (Thoracic Aorta)   ----------------------------------------------------------------------------------------------------   AORTIC ROOT    Sinus of Valsalva Max Diameter A: 3.5 cm    Sinus of Valsalva Max Diameter B: 3.4 cm   +----------------------------------------------------------------------------------------------------+   ASCENDING AORTA    Dimension A: 2.7 cm    Dimension B: 2.5 cm   +----------------------------------------------------------------------------------------------------+   ARCH OF THE AORTA    Dimension A: 2 cm    Dimension B: 1.9 cm   +----------------------------------------------------------------------------------------------------+   ISTHMUS OF THE AORTA    Dimension A: 1.7 cm    Dimension B: 1.6 cm +----------------------------------------------------------------------------------------------------+   MID-DESCENDING AORTA    Dimension A: 1.6 cm    Dimension B: 1.5 cm   +----------------------------------------------------------------------------------------------------+   DISTAL DESCENDING AORTA    Dimension A: 1.4 cm    Dimension B: 1.4 cm   +----------------------------------------------------------------------------------------------------+    ----------------------------------------------------------------------------------------------------   MAIN PULMONARY ARTERY    Dimension A: 2.3 cm    Dimension B: 2 cm   +----------------------------------------------------------------------------------------------------+   RIGHT MAIN PULMONARY ARTERY    Dimension A: 1.5 cm    Dimension B: 1.4 cm   +----------------------------------------------------------------------------------------------------+   LEFT MAIN PULMONARY ARTERY    Dimension A: 1.7 cm    Dimension B: 1.4 cm   .----------------------------------------------------------------------------------------------------. IMPRESSION/PLAN:  Tha Acharya is a 33 y.o. with a history of a VSD, dysplastic aortic valve likely unicuspid aortic valve with mild to moderate AR and mild aortic root dilation. Barrington Marcelo has no cardiovascular complaints and exercises regularly. He has a history of VSD which is small and not hemodynamically significant. He in August 2021 underwent MAMI which showed a likely unicuspid aortic valve with mild to moderate AR. He underwent cardiac MRI/MRA in Dec 2021 which showed mild to moderate AR, normal LV size and systolic function, no evidence of aortic coarctation and an aortic root measuring 3.5 cm. On exam today he has a normal BP and HR on no cardiac medications. His ECG shows NSR. He has a soft diastolic murmur with no signs or symptoms of heart failure.  Echo today shows stable findings with only mild to moderate AI and normal LV size and function. We discussed his cardiac anatomy and the importance of continued cardiac follow up particularly to follow his aortic valve and aortic regurgitation. We asked Cassandra Miguel to call our clinic if he develops new cardiac symptoms in between visits so we can re-evaluate him as needed. [facility] A[facility] with ECHO    HEALTH MAINTENANCE:  - According to the [facility] 2007 guidelines for endocarditis prophylaxis this patient does NOT require antibiotics prior to dental work. - Exercise recommendations per the [employer] [place] [employer]: Self-limited   I saw and examined the patient and formulated a plan for evaluation and management with Mandi Evans. I have reviewed the ECG and echocardiogram and I agree with the note above.      Aracely Helton MD   Internal Medicine   The [address] [place], [place] [address]d Floor [facility]

## 2023-09-22 DIAGNOSIS — D69.6 THROMBOCYTOPENIA (HCC): Primary | ICD-10-CM

## 2023-09-25 ENCOUNTER — OFFICE VISIT (OUTPATIENT)
Dept: ONCOLOGY | Age: 33
End: 2023-09-25
Payer: COMMERCIAL

## 2023-09-25 ENCOUNTER — HOSPITAL ENCOUNTER (OUTPATIENT)
Dept: INFUSION THERAPY | Age: 33
Discharge: HOME | End: 2023-09-25
Payer: COMMERCIAL

## 2023-09-25 VITALS
WEIGHT: 128.2 LBS | HEART RATE: 88 BPM | OXYGEN SATURATION: 99 % | HEIGHT: 64 IN | RESPIRATION RATE: 20 BRPM | TEMPERATURE: 97 F | DIASTOLIC BLOOD PRESSURE: 53 MMHG | SYSTOLIC BLOOD PRESSURE: 119 MMHG | BODY MASS INDEX: 21.89 KG/M2

## 2023-09-25 VITALS
DIASTOLIC BLOOD PRESSURE: 53 MMHG | OXYGEN SATURATION: 99 % | HEIGHT: 64 IN | RESPIRATION RATE: 20 BRPM | SYSTOLIC BLOOD PRESSURE: 119 MMHG | WEIGHT: 128.2 LBS | BODY MASS INDEX: 21.89 KG/M2 | TEMPERATURE: 97 F | HEART RATE: 88 BPM

## 2023-09-25 DIAGNOSIS — D69.6 THROMBOCYTOPENIA (HCC): ICD-10-CM

## 2023-09-25 DIAGNOSIS — D69.6 THROMBOCYTOPENIA (HCC): Primary | ICD-10-CM

## 2023-09-25 LAB
ALBUMIN SERPL BCG-MCNC: 4.2 G/DL (ref 3.5–5.1)
ALP SERPL-CCNC: 87 U/L (ref 38–126)
ALT SERPL W/O P-5'-P-CCNC: 12 U/L (ref 11–66)
AST SERPL-CCNC: 26 U/L (ref 5–40)
BASOPHILS # BLD AUTO: 0 THOU/MM3 (ref 0–0.1)
BASOPHILS NFR BLD AUTO: 1 % (ref 0–3)
BILIRUB CONJ SERPL-MCNC: < 0.2 MG/DL (ref 0–0.3)
BILIRUB SERPL-MCNC: 0.3 MG/DL (ref 0.3–1.2)
BUN BLDP-MCNC: 20 MG/DL (ref 8–26)
CHLORIDE BLD-SCNC: 102 MEQ/L (ref 98–109)
CREAT BLD-MCNC: 1.5 MG/DL (ref 0.5–1.2)
EOSINOPHIL # BLD AUTO: 0.4 THOU/MM3 (ref 0–0.4)
EOSINOPHIL NFR BLD AUTO: 7 % (ref 0–4)
ERYTHROCYTE [DISTWIDTH] IN BLOOD BY AUTOMATED COUNT: 12.9 % (ref 11.5–14.5)
GFR SERPL CREATININE-BSD FRML MDRD: > 60 ML/MIN/1.73M2
GLUCOSE BLD-MCNC: 103 MG/DL (ref 70–108)
HCT VFR BLD AUTO: 44.5 % (ref 42–52)
HGB BLD-MCNC: 14.3 GM/DL (ref 14–18)
IMM GRANULOCYTES # BLD AUTO: 0.01 THOU/MM3 (ref 0–0.07)
IMM GRANULOCYTES NFR BLD AUTO: 0 %
IONIZED CALCIUM, WHOLE BLOOD: 1.19 MMOL/L (ref 1.12–1.32)
LYMPHOCYTES # BLD AUTO: 1.5 THOU/MM3 (ref 1–4.8)
LYMPHOCYTES NFR BLD AUTO: 27 % (ref 15–47)
MCH RBC QN AUTO: 32.2 PG (ref 26–33)
MCHC RBC AUTO-ENTMCNC: 32.1 GM/DL (ref 32.2–35.5)
MCV RBC AUTO: 100 FL (ref 80–94)
MONOCYTES # BLD AUTO: 0.4 THOU/MM3 (ref 0.4–1.3)
MONOCYTES NFR BLD AUTO: 7 % (ref 0–12)
NEUTROPHILS NFR BLD AUTO: 58 % (ref 43–75)
PLATELET # BLD AUTO: 113 THOU/MM3 (ref 130–400)
PMV BLD AUTO: 11.7 FL (ref 9.4–12.4)
POTASSIUM BLD-SCNC: 4.2 MEQ/L (ref 3.5–4.9)
PROT SERPL-MCNC: 6.2 G/DL (ref 6.1–8)
RBC # BLD AUTO: 4.44 MILL/MM3 (ref 4.7–6.1)
SEGMENTED NEUTROPHILS ABSOLUTE COUNT: 3.2 THOU/MM3 (ref 1.8–7.7)
SODIUM BLD-SCNC: 141 MEQ/L (ref 138–146)
TOTAL CO2, WHOLE BLOOD: 28 MEQ/L (ref 23–33)
WBC # BLD AUTO: 5.5 THOU/MM3 (ref 4.8–10.8)

## 2023-09-25 PROCEDURE — 80076 HEPATIC FUNCTION PANEL: CPT

## 2023-09-25 PROCEDURE — 99211 OFF/OP EST MAY X REQ PHY/QHP: CPT

## 2023-09-25 PROCEDURE — 85025 COMPLETE CBC W/AUTO DIFF WBC: CPT

## 2023-09-25 PROCEDURE — 99213 OFFICE O/P EST LOW 20 MIN: CPT | Performed by: NURSE PRACTITIONER

## 2023-09-25 PROCEDURE — 80047 BASIC METABLC PNL IONIZED CA: CPT

## 2023-09-25 NOTE — PROGRESS NOTES
[address] [facility]  [address]  [address] [facility]  Dept: [phone]  Loc: [phone]       Visit Date:9/25/2023     Latonia Mcnamara is a 29 y.o. male who presents today for:   Chief Complaint   Patient presents with    Follow-up     Thrombocytopenia ([address])        HPI:   Latonia Mcnamara is a 29 y.o. male with thrombocytopenia. The patient has a history of Opitz syndrome, congenital hypothyroidism, surgical repair of hip dysplasia (2010) and multiple surgeries for craniofacial abnormalities. The patient previously followed with [facility] and received DDAVP and platelet transfusions with surgical procedures. 09/21/2009 the patient was seen for initial evaluation by Dr. 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 09/11/2017 the patient was seen for initial evaluation with Dr. Yolande Kenyon regarding thrombocytopenia. Interval History 9/25/2023: The patient returns for follow-up on his thrombocytopenia. GFR and BMP results within normal limits. CBC results reveal WBC 5.5, Hgb 14.3, HCT 44.5%, , RDW 12.9% and platelet count 658,244. The patient denies any headaches, dizziness or vision changes. No chest pain, cough or SOB. No abdominal pain or GI issues. He denies any abnormal bleeding; no epistaxis, gingival bleeding, hemoptysis, hematemesis, hematuria, hematochezia, melena. He is maintaining a good appetite and stable weight. No fevers or drenching night sweats. PMH, SH, and FH:  I reviewed the patients medication list and allergy list as noted on the electronic medical record. The PMH, SH and FH were also reviewed as noted on the EMR. Review of Systems:   Review of Systems   Pertinent review of systems noted in HPI, all other ROS negative.    Objective:   Physical Exam   BP (!) 119/53 (Site: Right Upper Arm, Position: Sitting, Cuff Size: Medium Adult)   Pulse 88   Temp 97

## 2023-11-28 ENCOUNTER — OFFICE VISIT (OUTPATIENT)
Dept: CARDIOLOGY CLINIC | Age: 33
End: 2023-11-28
Payer: COMMERCIAL

## 2023-11-28 VITALS
HEART RATE: 84 BPM | BODY MASS INDEX: 22.01 KG/M2 | HEIGHT: 64 IN | SYSTOLIC BLOOD PRESSURE: 118 MMHG | DIASTOLIC BLOOD PRESSURE: 76 MMHG

## 2023-11-28 DIAGNOSIS — I35.1 NONRHEUMATIC AORTIC VALVE INSUFFICIENCY: Primary | ICD-10-CM

## 2023-11-28 DIAGNOSIS — Q21.0 VSD (VENTRICULAR SEPTAL DEFECT): ICD-10-CM

## 2023-11-28 PROCEDURE — 99213 OFFICE O/P EST LOW 20 MIN: CPT | Performed by: NUCLEAR MEDICINE

## 2023-11-28 NOTE — PROGRESS NOTES
[address] [facility]  [address]  [address]  Dept: [phone]  Dept Fax: [phone]  Loc: [phone]    Visit Date: 11/28/2023    Marissa Neves is a 29 y.o. male who presents todayfor:  Chief Complaint   Patient presents with    Follow-up    Valvular Heart Disease   Known VSD and AI   Followed at [facility]   No chest pain  No dyspnea reported  No changes in breathing  BP is stable  No dizziness  No syncope      HPI:  HPI  Past Medical History:   Diagnosis Date    Anemia     Aortic root dilation (HCC)     Bleeds easily ([address])     Bruises easily     CAD (coronary artery disease)     Hemophilia (HCC)     Needs platelets for every surgery    Thyroid disease     Hypothyroidism    VSD (ventricular septal defect and aortic arch hypoplasia       Past Surgical History:   Procedure Laterality Date    DENTAL SURGERY      multiple teeth excised, middle school age    EYE SURGERY Bilateral 1993    on muscle    FEMUR FRACTURE SURGERY Right     age 11 YO    FOOT SURGERY N/A 10/19/2022    Excision Lesion Right [place] Foot with Skin Graft & Left Anterior Elbow performed by Loly Thorne MD at [address]  12/1991    inguinal hernia    HIP SURGERY  2003    HYPOSPADIAS CORRECTION      age 2 YO    [address]      Age 17    MOLE REMOVAL  10/19/2022    mole removed from foot and arm. with skin graft from thigh to foot,.    TYMPANIC MEMBRANE REPAIR Right     age 10-15    TYMPANOSTOMY TUBE PLACEMENT      3 sets, starting age3, last set at age 13 YO     Family History   Problem Relation Age of Onset    No Known Problems Mother     No Known Problems Father     Diabetes Maternal Aunt     Diabetes Maternal Uncle     Diabetes Maternal Uncle     High Blood Pressure Maternal Grandmother     Heart Disease Maternal Grandmother     Cancer Maternal Grandmother         Breast and skin    Heart Disease Maternal Grandfather     Diabetes Maternal

## 2024-02-14 NOTE — PROGRESS NOTES
Subjective:  Review of Systems  General:   No fever, no chills, No fatigue or weight loss  Pulmonary:    No dyspnea, no wheezing  Cardiac:    Denies recent chest pain,   GI:     No nausea or vomiting, no abdominal pain  Neuro:    No dizziness or light headedness,   Musculoskeletal:  No recent active issues  Extremities:   No edema, no obvious claudication       Objective:  Physical Exam  /60   Pulse 72   Ht 5' 4\" (1.626 m)   Wt 120 lb 6.4 oz (54.6 kg)   BMI 20.67 kg/m²   General:   Well developed, well nourished  Lungs:   Clear to auscultation  Heart:    Normal S1 S2, Slight murmur. no rubs, no gallops  Abdomen:   Soft, non tender, no organomegalies, positive bowel sounds  Extremities:   No edema, no cyanosis, good peripheral pulses  Neurological:   Awake, alert, oriented. No obvious focal deficits  Musculoskelatal:  No obvious deformities    Assessment:      Diagnosis Orders   1. VSD (ventricular septal defect and aortic arch hypoplasia     as above  Cardiac fair     Plan:  No follow-ups on file. As above  Echo   Continue risk factor modification and medical management  Thank you for allowing me to participate in the care of your patient. Please don't hesitate to contact me regarding any further issues related to the patient care    Orders Placed:  No orders of the defined types were placed in this encounter. Medications Prescribed:  No orders of the defined types were placed in this encounter. Discussed use, benefit, and side effects of prescribed medications. All patient questions answered. Pt voicedunderstanding. Instructed to continue current medications, diet and exercise. Continue risk factor modification and medical management. Patient agreed with treatment plan. Follow up as directed.     Electronically signedby Jose M Lacy MD on 10/27/2020 at 1:35 PM no blurred vision/no change in level of consciousness/no confusion/no dizziness/no fever/no loss of consciousness

## 2024-04-18 ENCOUNTER — HOSPITAL ENCOUNTER (OUTPATIENT)
Age: 34
Discharge: HOME | End: 2024-04-20
Attending: INTERNAL MEDICINE
Payer: COMMERCIAL

## 2024-04-18 ENCOUNTER — HOSPITAL ENCOUNTER (OUTPATIENT)
Dept: PEDIATRICS | Age: 34
Discharge: HOME | End: 2024-04-18
Payer: COMMERCIAL

## 2024-04-18 VITALS
HEART RATE: 74 BPM | DIASTOLIC BLOOD PRESSURE: 56 MMHG | OXYGEN SATURATION: 98 % | RESPIRATION RATE: 16 BRPM | BODY MASS INDEX: 21.34 KG/M2 | HEIGHT: 64 IN | SYSTOLIC BLOOD PRESSURE: 122 MMHG | TEMPERATURE: 98 F | WEIGHT: 125 LBS

## 2024-04-18 DIAGNOSIS — I77.810 AORTIC ROOT DILATION (HCC): ICD-10-CM

## 2024-04-18 DIAGNOSIS — Q23.9 DYSPLASTIC AORTIC VALVE: ICD-10-CM

## 2024-04-18 DIAGNOSIS — Q21.0 VSD (VENTRICULAR SEPTAL DEFECT): ICD-10-CM

## 2024-04-18 DIAGNOSIS — Q21.0 VSD (VENTRICULAR SEPTAL DEFECT): Primary | ICD-10-CM

## 2024-04-18 LAB
ECHO AO ASC DIAM: 2.9 CM
ECHO AO ASCENDING AORTA INDEX: 1.81 CM/M2
ECHO AO SINUS VALSALVA DIAM: 3.5 CM
ECHO AO SINUS VALSALVA INDEX: 2.19 CM/M2
ECHO AO ST JNCT DIAM: 2.8 CM
ECHO AR MAX VEL PISA: 3.9 M/S
ECHO AV AREA PEAK VELOCITY: 4 CM2
ECHO AV AREA VTI: 4.1 CM2
ECHO AV AREA/BSA PEAK VELOCITY: 2.5 CM2/M2
ECHO AV AREA/BSA VTI: 2.6 CM2/M2
ECHO AV CUSP MM: 1.9 CM
ECHO AV MEAN GRADIENT: 2 MMHG
ECHO AV MEAN VELOCITY: 0.7 M/S
ECHO AV PEAK GRADIENT: 4 MMHG
ECHO AV PEAK VELOCITY: 1 M/S
ECHO AV REGURGITANT PHT: 368 MS
ECHO AV VELOCITY RATIO: 0.9
ECHO AV VTI: 20.4 CM
ECHO BSA: 1.6 M2
ECHO LA AREA 2C: 10.3 CM2
ECHO LA AREA 4C: 8.9 CM2
ECHO LA DIAMETER INDEX: 1.69 CM/M2
ECHO LA DIAMETER: 2.7 CM
ECHO LA MAJOR AXIS: 3.7 CM
ECHO LA MINOR AXIS: 3.8 CM
ECHO LA VOL BP: 20 ML (ref 18–58)
ECHO LA VOL MOD A2C: 23 ML (ref 18–58)
ECHO LA VOL MOD A4C: 17 ML (ref 18–58)
ECHO LA VOL/BSA BIPLANE: 13 ML/M2 (ref 16–34)
ECHO LA VOLUME INDEX MOD A2C: 14 ML/M2 (ref 16–34)
ECHO LA VOLUME INDEX MOD A4C: 11 ML/M2 (ref 16–34)
ECHO LV E' LATERAL VELOCITY: 12 CM/S
ECHO LV E' SEPTAL VELOCITY: 10 CM/S
ECHO LV FRACTIONAL SHORTENING: 26 % (ref 28–44)
ECHO LV INTERNAL DIMENSION DIASTOLE INDEX: 2.88 CM/M2
ECHO LV INTERNAL DIMENSION DIASTOLIC: 4.6 CM (ref 4.2–5.9)
ECHO LV INTERNAL DIMENSION SYSTOLIC INDEX: 2.13 CM/M2
ECHO LV INTERNAL DIMENSION SYSTOLIC: 3.4 CM
ECHO LV ISOVOLUMETRIC RELAXATION TIME (IVRT): 77 MS
ECHO LV IVSD: 1 CM (ref 0.6–1)
ECHO LV MASS 2D: 158.8 G (ref 88–224)
ECHO LV MASS INDEX 2D: 99.3 G/M2 (ref 49–115)
ECHO LV POSTERIOR WALL DIASTOLIC: 1 CM (ref 0.6–1)
ECHO LV RELATIVE WALL THICKNESS RATIO: 0.43
ECHO LVOT AREA: 4.5 CM2
ECHO LVOT AV VTI INDEX: 0.92
ECHO LVOT DIAM: 2.4 CM
ECHO LVOT MEAN GRADIENT: 2 MMHG
ECHO LVOT PEAK GRADIENT: 3 MMHG
ECHO LVOT PEAK VELOCITY: 0.9 M/S
ECHO LVOT STROKE VOLUME INDEX: 52.8 ML/M2
ECHO LVOT SV: 84.6 ML
ECHO LVOT VTI: 18.7 CM
ECHO MV A VELOCITY: 0.64 M/S
ECHO MV E DECELERATION TIME (DT): 224 MS
ECHO MV E VELOCITY: 0.57 M/S
ECHO MV E/A RATIO: 0.89
ECHO MV E/E' LATERAL: 4.75
ECHO MV E/E' RATIO (AVERAGED): 5.23
ECHO PV MAX VELOCITY: 0.6 M/S
ECHO PV PEAK GRADIENT: 2 MMHG
ECHO RV FREE WALL PEAK S': 14 CM/S
ECHO RV INTERNAL DIMENSION: 2.3 CM
ECHO RV TAPSE: 1.9 CM (ref 1.7–?)
ECHO TV E WAVE: 0.8 M/S
EKG ATRIAL RATE: 72 BPM
EKG P AXIS: 64 DEGREES
EKG P-R INTERVAL: 136 MS
EKG Q-T INTERVAL: 418 MS
EKG QRS DURATION: 84 MS
EKG QTC CALCULATION (BAZETT): 457 MS
EKG R AXIS: 74 DEGREES
EKG T AXIS: 49 DEGREES
EKG VENTRICULAR RATE: 72 BPM

## 2024-04-18 PROCEDURE — 93303 ECHO TRANSTHORACIC: CPT

## 2024-04-18 PROCEDURE — 99212 OFFICE O/P EST SF 10 MIN: CPT

## 2024-04-18 PROCEDURE — 93005 ELECTROCARDIOGRAM TRACING: CPT | Performed by: INTERNAL MEDICINE

## 2024-04-18 PROCEDURE — 93325 DOPPLER ECHO COLOR FLOW MAPG: CPT

## 2024-04-18 NOTE — PLAN OF CARE
Provider discussed disease process, treatment plan, medications,and discharge instructions.  Patient and mother agrees with plan.  Any questions were answered.  Care plan reviewed with patient and mother.

## 2024-04-18 NOTE — PROGRESS NOTES
DIAGNOSES:  1. VSD - small and not hemodynamically significant  2. 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 Aortic root dilation (3.5 cm on MRA 2021)    HPI:  Harjeet Pearl is a 31 y.o. male with VSD, abnormal aortic valve (possibly unicuspid) with mild to moderate AR and mild aortic root dilation. He was last seen at [facility] in the [facility] clinic in 2014 by Dr. Jimenez. Harjeet was born at 30 weeks. He was diagnosed around the age of 7 yo at a routine pediatrics appointment. He never required cardiac surgery although has had multiple orthopedic surgeries. He was an active child but was not as active as his friends more due to depth perception issues.     He has done well since his last visit. He is very active and lives with his mother, Jenn.  He denies CP, SOB, palpitations, lightheadedness, dizziness, syncope, LE edema, orthopnea or PND.    Past Medical History:   Diagnosis Date    Anemia     Aortic root dilation (HCC)     Bleeds easily (HCC)     Bruises easily     CAD (coronary artery disease)     Hemophilia (HCC)     Needs platelets for every surgery    Thyroid disease     Hypothyroidism    VSD (ventricular septal defect and aortic arch hypoplasia      Past Surgical History:   Procedure Laterality Date    DENTAL SURGERY      multiple teeth excised, middle school age    EYE SURGERY Bilateral 1993    on muscle    FEMUR FRACTURE SURGERY Right     age 6 YO    FOOT SURGERY N/A 10/19/2022    Excision Lesion Right [place] Foot with Skin Graft & Left Anterior Elbow performed by Denver Maldonado MD at [facility]    HERNIA REPAIR  12/1991    inguinal hernia    HIP SURGERY  2003    HYPOSPADIAS CORRECTION      age 2 YO    MANDIBLE SURGERY      Age 18    MOLE REMOVAL  10/19/2022    mole removed from foot and arm. with skin graft from thigh to foot,.    TYMPANIC MEMBRANE REPAIR Right     age 11-12    TYMPANOSTOMY TUBE PLACEMENT      3 sets, starting age3, last set at age 5 YO     Social History

## 2024-04-18 NOTE — DISCHARGE INSTRUCTIONS
Contact information: If you need to reach Dr. Cohen for questions or concerns please call our office at [phone] (M-F 8am-5pm). After hours or on the weekends call [phone] and ask for the Adult Cardiologist on call.   Continue care with your PCP  You will have an EKG/ECHO @ return visit  Medications: Continue as you are  No antibiotic is needed prior to any dental work  Return visit is scheduled: in 1 year

## 2024-09-23 ENCOUNTER — OFFICE VISIT (OUTPATIENT)
Dept: ONCOLOGY | Age: 34
End: 2024-09-23
Payer: MEDICARE

## 2024-09-23 ENCOUNTER — HOSPITAL ENCOUNTER (OUTPATIENT)
Dept: INFUSION THERAPY | Age: 34
Discharge: HOME | End: 2024-09-23
Payer: MEDICARE

## 2024-09-23 VITALS
DIASTOLIC BLOOD PRESSURE: 57 MMHG | RESPIRATION RATE: 16 BRPM | OXYGEN SATURATION: 100 % | SYSTOLIC BLOOD PRESSURE: 118 MMHG | HEART RATE: 97 BPM | TEMPERATURE: 98.1 F

## 2024-09-23 VITALS
RESPIRATION RATE: 16 BRPM | HEIGHT: 64 IN | HEART RATE: 97 BPM | TEMPERATURE: 98.1 F | BODY MASS INDEX: 20.96 KG/M2 | SYSTOLIC BLOOD PRESSURE: 118 MMHG | OXYGEN SATURATION: 100 % | WEIGHT: 122.8 LBS | DIASTOLIC BLOOD PRESSURE: 57 MMHG

## 2024-09-23 DIAGNOSIS — D69.6 THROMBOCYTOPENIA (HCC): ICD-10-CM

## 2024-09-23 DIAGNOSIS — D69.6 THROMBOCYTOPENIA (HCC): Primary | ICD-10-CM

## 2024-09-23 LAB
ALBUMIN SERPL BCG-MCNC: 4.5 G/DL (ref 3.5–5.1)
ALP SERPL-CCNC: 92 U/L (ref 38–126)
ALT SERPL W/O P-5'-P-CCNC: 9 U/L (ref 11–66)
AST SERPL-CCNC: 28 U/L (ref 5–40)
BASOPHILS # BLD AUTO: 0.1 THOU/MM3 (ref 0–0.1)
BASOPHILS NFR BLD AUTO: 1 % (ref 0–3)
BILIRUB CONJ SERPL-MCNC: < 0.1 MG/DL (ref 0.1–13.8)
BILIRUB SERPL-MCNC: 0.2 MG/DL (ref 0.3–1.2)
BUN BLDP-MCNC: 15 MG/DL (ref 8–26)
CHLORIDE BLD-SCNC: 103 MEQ/L (ref 98–109)
CREAT BLD-MCNC: 1.2 MG/DL (ref 0.5–1.2)
EOSINOPHIL # BLD AUTO: 0.6 THOU/MM3 (ref 0–0.4)
EOSINOPHIL NFR BLD AUTO: 9 % (ref 0–4)
ERYTHROCYTE [DISTWIDTH] IN BLOOD BY AUTOMATED COUNT: 12.5 % (ref 11.5–14.5)
GFR SERPL CREATININE-BSD FRML MDRD: 82 ML/MIN/1.73M2
GLUCOSE BLD-MCNC: 167 MG/DL (ref 70–108)
HCT VFR BLD AUTO: 45.5 % (ref 42–52)
HGB BLD-MCNC: 14.7 GM/DL (ref 14–18)
IMM GRANULOCYTES # BLD AUTO: 0.03 THOU/MM3 (ref 0–0.07)
IMM GRANULOCYTES NFR BLD AUTO: 1 %
IONIZED CALCIUM, WHOLE BLOOD: 1.21 MMOL/L (ref 1.12–1.32)
LYMPHOCYTES # BLD AUTO: 1.9 THOU/MM3 (ref 1–4.8)
LYMPHOCYTES NFR BLD AUTO: 29 % (ref 15–47)
MCH RBC QN AUTO: 31.7 PG (ref 26–33)
MCHC RBC AUTO-ENTMCNC: 32.3 GM/DL (ref 32.2–35.5)
MCV RBC AUTO: 98 FL (ref 80–94)
MONOCYTES # BLD AUTO: 0.4 THOU/MM3 (ref 0.4–1.3)
MONOCYTES NFR BLD AUTO: 6 % (ref 0–12)
NEUTROPHILS ABSOLUTE: 3.6 THOU/MM3 (ref 1.8–7.7)
NEUTROPHILS NFR BLD AUTO: 55 % (ref 43–75)
PLATELET # BLD AUTO: 158 THOU/MM3 (ref 130–400)
PMV BLD AUTO: 11.4 FL (ref 9.4–12.4)
POTASSIUM BLD-SCNC: 4 MEQ/L (ref 3.5–4.9)
PROT SERPL-MCNC: 7.2 G/DL (ref 6.1–8)
RBC # BLD AUTO: 4.63 MILL/MM3 (ref 4.7–6.1)
SODIUM BLD-SCNC: 137 MEQ/L (ref 138–146)
TOTAL CO2, WHOLE BLOOD: 26 MEQ/L (ref 23–33)
WBC # BLD AUTO: 6.6 THOU/MM3 (ref 4.8–10.8)

## 2024-09-23 PROCEDURE — G8427 DOCREV CUR MEDS BY ELIG CLIN: HCPCS | Performed by: NURSE PRACTITIONER

## 2024-09-23 PROCEDURE — 1036F TOBACCO NON-USER: CPT | Performed by: NURSE PRACTITIONER

## 2024-09-23 PROCEDURE — 80047 BASIC METABLC PNL IONIZED CA: CPT

## 2024-09-23 PROCEDURE — 80076 HEPATIC FUNCTION PANEL: CPT

## 2024-09-23 PROCEDURE — G8420 CALC BMI NORM PARAMETERS: HCPCS | Performed by: NURSE PRACTITIONER

## 2024-09-23 PROCEDURE — 99213 OFFICE O/P EST LOW 20 MIN: CPT | Performed by: NURSE PRACTITIONER

## 2024-09-23 PROCEDURE — 99211 OFF/OP EST MAY X REQ PHY/QHP: CPT

## 2024-09-23 PROCEDURE — 85025 COMPLETE CBC W/AUTO DIFF WBC: CPT

## 2024-09-24 LAB
REVIEWED BY: NORMAL
SMEAR REVIEW: NORMAL

## 2024-11-13 ENCOUNTER — TELEPHONE (OUTPATIENT)
Dept: ONCOLOGY | Age: 34
End: 2024-11-13

## 2024-11-13 NOTE — TELEPHONE ENCOUNTER
Patient called the office stating he is having Surgery on his eardrum by Dr. Chauhan on 1/27/2025 and he wants to know if you want to see him prior to this surgery. His follow up is 9/22/2025. Please advise.

## 2024-11-18 DIAGNOSIS — D69.6 THROMBOCYTOPENIA (HCC): Primary | ICD-10-CM

## 2024-11-26 ENCOUNTER — OFFICE VISIT (OUTPATIENT)
Dept: CARDIOLOGY CLINIC | Age: 34
End: 2024-11-26

## 2024-11-26 VITALS
WEIGHT: 125.2 LBS | DIASTOLIC BLOOD PRESSURE: 74 MMHG | BODY MASS INDEX: 21.37 KG/M2 | HEIGHT: 64 IN | SYSTOLIC BLOOD PRESSURE: 117 MMHG | HEART RATE: 91 BPM

## 2024-11-26 DIAGNOSIS — I77.810 AORTIC ROOT DILATION (HCC): Primary | ICD-10-CM

## 2024-11-26 DIAGNOSIS — I35.1 NONRHEUMATIC AORTIC VALVE INSUFFICIENCY: ICD-10-CM

## 2024-11-26 NOTE — PROGRESS NOTES
Pt C/O No cardiac symptoms as of today.    Pt is here for pre op clearance Dr. Pelaez, ear drum repair      Pt denies CP, SOB, Headache, dizziness, heart palpitations, swelling, fatigue  
Breast and skin    Heart Disease Maternal Grandfather     Diabetes Maternal Grandfather     Stroke Neg Hx      Social History     Tobacco Use    Smoking status: Never     Passive exposure: Current    Smokeless tobacco: Never    Tobacco comments:     Dad smokes outside   Substance Use Topics    Alcohol use: No      Current Outpatient Medications   Medication Sig Dispense Refill    Multiple Vitamins-Minerals (THERAPEUTIC MULTIVITAMIN-MINERALS) tablet Take 1 tablet by mouth daily      SYNTHROID 50 MCG tablet Take 1 tablet by mouth daily      ibuprofen (ADVIL;MOTRIN) 200 MG tablet Take 1 tablet by mouth every 6 hours as needed for Pain       No current facility-administered medications for this visit.     No Known Allergies  Health Maintenance   Topic Date Due    Polio vaccine (2 of 3 - 4-dose series) 1991    Depression Screen  Never done    Varicella vaccine (1 of 2 - 13+ 2-dose series) Never done    HIV screen  Never done    Hepatitis C screen  Never done    Hepatitis B vaccine (1 of 3 - 19+ 3-dose series) Never done    DTaP/Tdap/Td vaccine (1 - Tdap) Never done    Annual Wellness Visit (Medicare)  Never done    Flu vaccine (1) Never done    COVID-19 Vaccine (1 - 2023-24 season) Never done    Hepatitis A vaccine  Aged Out    Hib vaccine  Aged Out    HPV vaccine  Aged Out    Meningococcal (ACWY) vaccine  Aged Out    Pneumococcal 0-64 years Vaccine  Aged Out       Subjective:  General:   No fever, no chills, No fatigue or weight loss  Pulmonary:    No dyspnea, no wheezing  Cardiac:    Denies recent chest pain,   GI:     No nausea or vomiting, no abdominal pain  Neuro:    No dizziness or light headedness,   Musculoskeletal:  No recent active issues  Extremities:   No edema, no obvious claudication       Objective:  General:   Well developed, well nourished  Lungs:   Clear to auscultation  Heart:    Normal S1 S2, Slight murmur. no rubs, no gallops  Abdomen:   Soft, non tender, no organomegalies, positive bowel

## 2025-03-03 ENCOUNTER — TELEPHONE (OUTPATIENT)
Dept: PEDIATRICS | Age: 35
End: 2025-03-03

## 2025-03-03 NOTE — TELEPHONE ENCOUNTER
I reached out to Harjeet to see if he could come in for an appointment in March and had to leave a . I need to verify his insurance also.

## 2025-04-07 ENCOUNTER — TELEPHONE (OUTPATIENT)
Dept: PEDIATRICS | Age: 35
End: 2025-04-07

## 2025-04-07 NOTE — TELEPHONE ENCOUNTER
I reached out to [facility] to find out if a prior auth was needed for an ECHO/[id number]. I left a message but also faxed in the information to [phone]. [facility] called back to say no precert/prior auth is required for 30022/ECHO.

## 2025-04-16 NOTE — PROGRESS NOTES
[facility] Adult Congenital Heart () Program Clinic Note    History of Present Illness    We had the pleasure of seeing Harjeet Pearl in the [facility] Adult Congenital Heart Disease outreach clinic at [facility] today.     Harjeet is a 34 y.o. 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 He was originally diagnosed around 6 years of age following a routine pediatrics appointment where he was noted to have a murmur. He has not required cardiac surgery, though he has required several orthopedic surgeries in the past.     We last evaluated him on 4/18/24, at which time he was doing well. His echo showed stable findings with small non-hemodynamically significant VSD, mild to moderate AI, and normal LV size and function.     He presents today for routine cardiology follow up.    Interval History/Subjective  Informant: patient and dad.   Since his last visit, he has been doing well.   Reports exercising regularly by walking in town and walking his dog, Irene alberto [employer]ocalyx[employer].   States he will typically walk about 4 miles without needing a break.   He denies exertional symptoms.    Denies any ED visits, hospitalizations or significant illnesses.    Does not report any new cardiovascular concerns or symptoms.   Patient otherwise denies symptoms of chest pain, palpitations, shortness of breath at rest, dyspnea on exertion, PND, orthopnea, syncope, pre-syncope, lightheadedness, dizziness, or peripheral edema.    Review of prior records was also performed for additional pertinent history.    Past Medical History:   Diagnosis Date    Anemia     Aortic root dilation     Bleeds easily     Bruises easily     CAD (coronary artery disease)     Hemophilia (HCC)     Needs platelets for every surgery    Thyroid disease     Hypothyroidism    VSD (ventricular septal

## 2025-04-17 ENCOUNTER — HOSPITAL ENCOUNTER (OUTPATIENT)
Age: 35
Discharge: HOME | End: 2025-04-19
Attending: INTERNAL MEDICINE
Payer: COMMERCIAL

## 2025-04-17 ENCOUNTER — HOSPITAL ENCOUNTER (OUTPATIENT)
Dept: PEDIATRICS | Age: 35
Discharge: HOME | End: 2025-04-17
Payer: COMMERCIAL

## 2025-04-17 VITALS
BODY MASS INDEX: 21.44 KG/M2 | HEIGHT: 64 IN | RESPIRATION RATE: 16 BRPM | TEMPERATURE: 97.6 F | HEART RATE: 100 BPM | SYSTOLIC BLOOD PRESSURE: 132 MMHG | DIASTOLIC BLOOD PRESSURE: 69 MMHG | WEIGHT: 125.6 LBS | OXYGEN SATURATION: 96 %

## 2025-04-17 DIAGNOSIS — I77.810 AORTIC ROOT DILATION: ICD-10-CM

## 2025-04-17 DIAGNOSIS — Q21.0 VSD (VENTRICULAR SEPTAL DEFECT): ICD-10-CM

## 2025-04-17 DIAGNOSIS — Q23.9 DYSPLASTIC AORTIC VALVE: ICD-10-CM

## 2025-04-17 DIAGNOSIS — Q23.9 DYSPLASTIC AORTIC VALVE: Primary | ICD-10-CM

## 2025-04-17 LAB
ECHO AO ASC DIAM: 2.8 CM
ECHO AO ASCENDING AORTA INDEX: 1.74 CM/M2
ECHO AO SINUS VALSALVA DIAM: 3.6 CM
ECHO AO SINUS VALSALVA INDEX: 2.24 CM/M2
ECHO AO ST JNCT DIAM: 2.8 CM
ECHO AR MAX VEL PISA: 4.8 M/S
ECHO AV CUSP MM: 1.9 CM
ECHO AV MEAN GRADIENT: 2 MMHG
ECHO AV MEAN VELOCITY: 0.7 M/S
ECHO AV PEAK GRADIENT: 5 MMHG
ECHO AV PEAK VELOCITY: 1.1 M/S
ECHO AV REGURGITANT PHT: 380 MS
ECHO AV VELOCITY RATIO: 0.82
ECHO AV VTI: 19.7 CM
ECHO BSA: 1.61 M2
ECHO EST RA PRESSURE: 3 MMHG
ECHO LA AREA 2C: 9.8 CM2
ECHO LA AREA 4C: 11.2 CM2
ECHO LA DIAMETER INDEX: 1.86 CM/M2
ECHO LA DIAMETER: 3 CM
ECHO LA MAJOR AXIS: 4.1 CM
ECHO LA MINOR AXIS: 3.6 CM
ECHO LA VOL BP: 25 ML (ref 18–58)
ECHO LA VOL MOD A2C: 22 ML (ref 18–58)
ECHO LA VOL MOD A4C: 24 ML (ref 18–58)
ECHO LA VOL/BSA BIPLANE: 16 ML/M2 (ref 16–34)
ECHO LA VOLUME INDEX MOD A2C: 14 ML/M2 (ref 16–34)
ECHO LA VOLUME INDEX MOD A4C: 15 ML/M2 (ref 16–34)
ECHO LV E' LATERAL VELOCITY: 10.6 CM/S
ECHO LV E' SEPTAL VELOCITY: 11 CM/S
ECHO LV FRACTIONAL SHORTENING: 28 % (ref 28–44)
ECHO LV INTERNAL DIMENSION DIASTOLE INDEX: 2.86 CM/M2
ECHO LV INTERNAL DIMENSION DIASTOLIC: 4.6 CM (ref 4.2–5.9)
ECHO LV INTERNAL DIMENSION SYSTOLIC INDEX: 2.05 CM/M2
ECHO LV INTERNAL DIMENSION SYSTOLIC: 3.3 CM
ECHO LV IVSD: 1 CM (ref 0.6–1)
ECHO LV MASS 2D: 148.1 G (ref 88–224)
ECHO LV MASS INDEX 2D: 92 G/M2 (ref 49–115)
ECHO LV POSTERIOR WALL DIASTOLIC: 0.9 CM (ref 0.6–1)
ECHO LV RELATIVE WALL THICKNESS RATIO: 0.39
ECHO LVOT AV VTI INDEX: 0.82
ECHO LVOT MEAN GRADIENT: 2 MMHG
ECHO LVOT PEAK GRADIENT: 3 MMHG
ECHO LVOT PEAK VELOCITY: 0.9 M/S
ECHO LVOT VTI: 16.1 CM
ECHO MV A VELOCITY: 0.6 M/S
ECHO MV E DECELERATION TIME (DT): 173 MS
ECHO MV E VELOCITY: 0.67 M/S
ECHO MV E/A RATIO: 1.12
ECHO MV E/E' LATERAL: 6.32
ECHO MV E/E' RATIO (AVERAGED): 6.21
ECHO MV E/E' SEPTAL: 6.09
ECHO PV MAX VELOCITY: 0.6 M/S
ECHO PV PEAK GRADIENT: 2 MMHG
ECHO RV FREE WALL PEAK S': 14.7 CM/S
ECHO RV INTERNAL DIMENSION: 2.6 CM
ECHO RV TAPSE: 1.7 CM (ref 1.7–?)
ECHO TV E WAVE: 0.7 M/S
EKG ATRIAL RATE: 83 BPM
EKG P AXIS: 67 DEGREES
EKG P-R INTERVAL: 128 MS
EKG Q-T INTERVAL: 384 MS
EKG QRS DURATION: 82 MS
EKG QTC CALCULATION (BAZETT): 451 MS
EKG R AXIS: 73 DEGREES
EKG T AXIS: 54 DEGREES
EKG VENTRICULAR RATE: 83 BPM

## 2025-04-17 PROCEDURE — 99212 OFFICE O/P EST SF 10 MIN: CPT

## 2025-04-17 PROCEDURE — 93005 ELECTROCARDIOGRAM TRACING: CPT | Performed by: INTERNAL MEDICINE

## 2025-04-17 PROCEDURE — 93303 ECHO TRANSTHORACIC: CPT

## 2025-04-17 NOTE — PROGRESS NOTES
I saw and examined the patient and formulated a plan for evaluation and management with Jordyn Sifuentes CNP. I have reviewed the ECG and echocardiogram and I agree with the note above.     Asymptomatic from a CV perspective  CV exam is stable.   Stable valve and ventricular function on echo. Small apical muscular residual VSD.     Zay Cohen MD   Internal Medicine   [facility]

## 2025-04-17 NOTE — PLAN OF CARE
Provider discussed disease process, treatment plan, medications,and discharge instructions.  Patient and parent agrees with plan.  Any questions were answered.  Care plan reviewed with patient and parent.  Goal: No falls during the visit, achieved.

## 2025-04-17 NOTE — DISCHARGE INSTRUCTIONS
Contact information: If you need to reach Dr. Cohen for questions or concerns please call our office at [phone] (M-F 8am-5pm). After hours or on the weekends call [phone] and ask for the Adult Cardiologist on call.   Continue care with your PCP  You will have an EKG/ECHO @ return visit  Medications: Continue as you are  No antibiotic is needed prior to any dental work  Return visit is scheduled: April 16, 2026 @ 9:45 am

## (undated) DEVICE — PACK PROCEDURE SURG PLAS SC MIN SRHP LF

## (undated) DEVICE — DRAPE,EXTREMITY,89X128,STERILE: Brand: MEDLINE

## (undated) DEVICE — CHLORAPREP 26ML CLEAR

## (undated) DEVICE — GOWN,SIRUS,NON REINFRCD,LARGE,SET IN SL: Brand: MEDLINE

## (undated) DEVICE — GLOVE ORANGE PI 7   MSG9070

## (undated) DEVICE — COTTON BALL ST

## (undated) DEVICE — GARMENT,MEDLINE,DVT,INT,CALF,MED, GEN2: Brand: MEDLINE

## (undated) DEVICE — GLOVE SURG SZ 8 L11.77IN FNGR THK9.8MIL STRW LTX POLYMER

## (undated) DEVICE — SUTURE ETHLN SZ 4-0 L18IN NONABSORBABLE BLK L16MM PS-4 1/2 1662G

## (undated) DEVICE — DRAPE,U/SHT,SPLIT,FILM,60X84,STERILE: Brand: MEDLINE